# Patient Record
Sex: FEMALE | Race: WHITE | NOT HISPANIC OR LATINO | Employment: FULL TIME | URBAN - METROPOLITAN AREA
[De-identification: names, ages, dates, MRNs, and addresses within clinical notes are randomized per-mention and may not be internally consistent; named-entity substitution may affect disease eponyms.]

---

## 2017-10-30 ENCOUNTER — TRANSCRIBE ORDERS (OUTPATIENT)
Dept: ADMINISTRATIVE | Facility: HOSPITAL | Age: 47
End: 2017-10-30

## 2017-10-30 DIAGNOSIS — C43.9 MALIGNANT MELANOMA, UNSPECIFIED SITE (HCC): Primary | ICD-10-CM

## 2017-11-03 ENCOUNTER — HOSPITAL ENCOUNTER (OUTPATIENT)
Dept: RADIOLOGY | Age: 47
Discharge: HOME | End: 2017-11-03
Payer: COMMERCIAL

## 2017-11-03 ENCOUNTER — TRANSCRIBE ORDERS (OUTPATIENT)
Dept: ADMINISTRATIVE | Age: 47
End: 2017-11-03

## 2017-11-03 ENCOUNTER — APPOINTMENT (OUTPATIENT)
Dept: LAB | Age: 47
End: 2017-11-03
Payer: COMMERCIAL

## 2017-11-03 DIAGNOSIS — C43.52: ICD-10-CM

## 2017-11-03 DIAGNOSIS — C43.9 MALIGNANT MELANOMA, UNSPECIFIED SITE (HCC): ICD-10-CM

## 2017-11-03 DIAGNOSIS — C43.52: Primary | ICD-10-CM

## 2017-11-03 LAB
25(OH)D3 SERPL-MCNC: 22.7 NG/ML (ref 30–100)
ALBUMIN SERPL BCP-MCNC: 3.7 G/DL (ref 3.5–5)
ALP SERPL-CCNC: 84 U/L (ref 46–116)
ALT SERPL W P-5'-P-CCNC: 23 U/L (ref 12–78)
ANION GAP SERPL CALCULATED.3IONS-SCNC: 4 MMOL/L (ref 4–13)
AST SERPL W P-5'-P-CCNC: 12 U/L (ref 5–45)
BILIRUB SERPL-MCNC: 0.3 MG/DL (ref 0.2–1)
BUN SERPL-MCNC: 11 MG/DL (ref 5–25)
CALCIUM SERPL-MCNC: 8.7 MG/DL (ref 8.3–10.1)
CHLORIDE SERPL-SCNC: 106 MMOL/L (ref 100–108)
CO2 SERPL-SCNC: 28 MMOL/L (ref 21–32)
CREAT SERPL-MCNC: 0.89 MG/DL (ref 0.6–1.3)
ERYTHROCYTE [DISTWIDTH] IN BLOOD BY AUTOMATED COUNT: 12.7 % (ref 11.6–15.1)
GFR SERPL CREATININE-BSD FRML MDRD: 78 ML/MIN/1.73SQ M
GLUCOSE P FAST SERPL-MCNC: 100 MG/DL (ref 65–99)
HCT VFR BLD AUTO: 40.5 % (ref 34.8–46.1)
HGB BLD-MCNC: 13.8 G/DL (ref 11.5–15.4)
MCH RBC QN AUTO: 29.8 PG (ref 26.8–34.3)
MCHC RBC AUTO-ENTMCNC: 34.1 G/DL (ref 31.4–37.4)
MCV RBC AUTO: 88 FL (ref 82–98)
PLATELET # BLD AUTO: 356 THOUSANDS/UL (ref 149–390)
PMV BLD AUTO: 10.4 FL (ref 8.9–12.7)
POTASSIUM SERPL-SCNC: 4.6 MMOL/L (ref 3.5–5.3)
PROT SERPL-MCNC: 7.6 G/DL (ref 6.4–8.2)
RBC # BLD AUTO: 4.63 MILLION/UL (ref 3.81–5.12)
SODIUM SERPL-SCNC: 138 MMOL/L (ref 136–145)
TSH SERPL DL<=0.05 MIU/L-ACNC: 2.25 UIU/ML (ref 0.36–3.74)
WBC # BLD AUTO: 7.45 THOUSAND/UL (ref 4.31–10.16)

## 2017-11-03 PROCEDURE — 70450 CT HEAD/BRAIN W/O DYE: CPT

## 2017-11-03 PROCEDURE — 84443 ASSAY THYROID STIM HORMONE: CPT

## 2017-11-03 PROCEDURE — 85027 COMPLETE CBC AUTOMATED: CPT

## 2017-11-03 PROCEDURE — 82306 VITAMIN D 25 HYDROXY: CPT

## 2017-11-03 PROCEDURE — 36415 COLL VENOUS BLD VENIPUNCTURE: CPT

## 2017-11-03 PROCEDURE — 80053 COMPREHEN METABOLIC PANEL: CPT

## 2020-02-10 ENCOUNTER — OFFICE VISIT (OUTPATIENT)
Dept: URGENT CARE | Age: 50
End: 2020-02-10
Payer: COMMERCIAL

## 2020-02-10 VITALS
HEIGHT: 65 IN | HEART RATE: 76 BPM | RESPIRATION RATE: 20 BRPM | TEMPERATURE: 98.2 F | BODY MASS INDEX: 37.42 KG/M2 | OXYGEN SATURATION: 98 % | SYSTOLIC BLOOD PRESSURE: 141 MMHG | WEIGHT: 224.6 LBS | DIASTOLIC BLOOD PRESSURE: 90 MMHG

## 2020-02-10 DIAGNOSIS — J40 BRONCHITIS: ICD-10-CM

## 2020-02-10 DIAGNOSIS — J01.90 ACUTE NON-RECURRENT SINUSITIS, UNSPECIFIED LOCATION: Primary | ICD-10-CM

## 2020-02-10 PROCEDURE — 99203 OFFICE O/P NEW LOW 30 MIN: CPT | Performed by: PHYSICIAN ASSISTANT

## 2020-02-10 RX ORDER — AMOXICILLIN AND CLAVULANATE POTASSIUM 875; 125 MG/1; MG/1
1 TABLET, FILM COATED ORAL EVERY 12 HOURS SCHEDULED
Qty: 20 TABLET | Refills: 0 | Status: SHIPPED | OUTPATIENT
Start: 2020-02-10 | End: 2020-02-20

## 2020-02-10 RX ORDER — BENZONATATE 100 MG/1
100 CAPSULE ORAL 3 TIMES DAILY PRN
Qty: 20 CAPSULE | Refills: 0 | Status: SHIPPED | OUTPATIENT
Start: 2020-02-10 | End: 2022-02-10 | Stop reason: ALTCHOICE

## 2020-02-10 RX ORDER — NAPROXEN SODIUM 550 MG/1
1 TABLET ORAL AS NEEDED
COMMUNITY
Start: 2012-04-25 | End: 2022-06-15

## 2020-02-10 RX ORDER — FLUTICASONE PROPIONATE 50 MCG
1 SPRAY, SUSPENSION (ML) NASAL DAILY
Qty: 1 BOTTLE | Refills: 0 | Status: SHIPPED | OUTPATIENT
Start: 2020-02-10 | End: 2022-02-10 | Stop reason: ALTCHOICE

## 2020-02-10 NOTE — PATIENT INSTRUCTIONS
Acute Bronchitis   WHAT YOU NEED TO KNOW:   Acute bronchitis is swelling and irritation in the air passages of your lungs  This irritation may cause you to cough or have other breathing problems  Acute bronchitis often starts because of another illness, such as a cold or the flu  The illness spreads from your nose and throat to your windpipe and airways  Bronchitis is often called a chest cold  Acute bronchitis lasts about 3 to 6 weeks and is usually not a serious illness  Your cough can last for several weeks  DISCHARGE INSTRUCTIONS:   Return to the emergency department if:   · You cough up blood  · Your lips or fingernails turn blue  · You feel like you are not getting enough air when you breathe  Contact your healthcare provider if:   · You have a fever  · Your breathing problems do not go away or get worse  · Your cough does not get better within 4 weeks  · You have questions or concerns about your condition or care  Self-care:   · Get more rest   Rest helps your body to heal  Slowly start to do more each day  Rest when you feel it is needed  · Avoid irritants in the air  Avoid chemicals, fumes, and dust  Wear a face mask if you must work around dust or fumes  Stay inside on days when air pollution levels are high  If you have allergies, stay inside when pollen counts are high  Do not use aerosol products, such as spray-on deodorant, bug spray, and hair spray  · Do not smoke or be around others who smoke  Nicotine and other chemicals in cigarettes and cigars damages the cilia that move mucus out of your lungs  Ask your healthcare provider for information if you currently smoke and need help to quit  E-cigarettes or smokeless tobacco still contain nicotine  Talk to your healthcare provider before you use these products  · Drink liquids as directed  Liquids help keep your air passages moist and help you cough up mucus   You may need to drink more liquids when you have acute bronchitis  Ask how much liquid to drink each day and which liquids are best for you  · Use a humidifier or vaporizer  Use a cool mist humidifier or a vaporizer to increase air moisture in your home  This may make it easier for you to breathe and help decrease your cough  Decrease risk for acute bronchitis:   · Get the vaccinations you need  Ask your healthcare provider if you should get vaccinated against the flu or pneumonia  · Prevent the spread of germs  You can decrease your risk of acute bronchitis and other illnesses by doing the following:     [facility] your hands often with soap and water  Carry germ-killing hand lotion or gel with you  You can use the lotion or gel to clean your hands when soap and water are not available  ¨ Do not touch your eyes, nose, or mouth unless you have washed your hands first     ¨ Always cover your mouth when you cough to prevent the spread of germs  It is best to cough into a tissue or your shirt sleeve instead of into your hand  Ask those around you cover their mouths when they cough  ¨ Try to avoid people who have a cold or the flu  If you are sick, stay away from others as much as possible  Medicines: Your healthcare provider may  give you any of the following:  · Ibuprofen or acetaminophen  are medicines that help lower your fever  They are available without a doctor's order  Ask your healthcare provider which medicine is right for you  Ask how much to take and how often to take it  Follow directions  These medicines can cause stomach bleeding if not taken correctly  Ibuprofen can cause kidney damage  Do not take ibuprofen if you have kidney disease, an ulcer, or allergies to aspirin  Acetaminophen can cause liver damage  Do not take more than 4,000 milligrams in 24 hours  · Decongestants  help loosen mucus in your lungs and make it easier to cough up  This can help you breathe easier  · Cough suppressants  decrease your urge to cough   If your cough produces mucus, do not take a cough suppressant unless your healthcare provider tells you to  Your healthcare provider may suggest that you take a cough suppressant at night so you can rest     · Inhalers  may be given  Your healthcare provider may give you one or more inhalers to help you breathe easier and cough less  An inhaler gives your medicine to open your airways  Ask your healthcare provider to show you how to use your inhaler correctly  · Take your medicine as directed  Contact your healthcare provider if you think your medicine is not helping or if you have side effects  Tell him of her if you are allergic to any medicine  Keep a list of the medicines, vitamins, and herbs you take  Include the amounts, and when and why you take them  Bring the list or the pill bottles to follow-up visits  Carry your medicine list with you in case of an emergency  Follow up with your healthcare provider as directed:  Write down questions you have so you will remember to ask them during your follow-up visits  © 2017 [employer] [employer] Information is for End User's use only and may not be sold, redistributed or otherwise used for commercial purposes  All illustrations and images included in CareNotes® are the copyrighted property of [employer]  or Octaviano Lacy  The above information is an  only  It is not intended as medical advice for individual conditions or treatments  Talk to your doctor, nurse or pharmacist before following any medical regimen to see if it is safe and effective for you  Sinusitis   WHAT YOU NEED TO KNOW:   Sinusitis is inflammation or infection of your sinuses  It is most often caused by a virus  Acute sinusitis may last up to 12 weeks  Chronic sinusitis lasts longer than 12 weeks  Recurrent sinusitis means you have 4 or more times in 1 year     DISCHARGE INSTRUCTIONS:   Return to the emergency department if:   · Your eye and eyelid are red, swollen, and painful  · You cannot open your eye  · You have vision changes, such as double vision  · Your eyeball bulges out or you cannot move your eye  · You are more sleepy than normal, or you notice changes in your ability to think, move, or talk  · You have a stiff neck, a fever, or a bad headache  · You have swelling of your forehead or scalp  Contact your healthcare provider if:   · Your symptoms do not improve after 3 days  · Your symptoms do not go away after 10 days  · You have nausea and are vomiting  · Your nose is bleeding  · You have questions or concerns about your condition or care  Medicines: Your symptoms may go away on their own  Your healthcare provider may recommend watchful waiting for up to 10 days before starting antibiotics  You may  need any of the following:  · Acetaminophen  decreases pain and fever  It is available without a doctor's order  Ask how much to take and how often to take it  Follow directions  Read the labels of all other medicines you are using to see if they also contain acetaminophen, or ask your doctor or pharmacist  Acetaminophen can cause liver damage if not taken correctly  Do not use more than 4 grams (4,000 milligrams) total of acetaminophen in one day  · NSAIDs , such as ibuprofen, help decrease swelling, pain, and fever  This medicine is available with or without a doctor's order  NSAIDs can cause stomach bleeding or kidney problems in certain people  If you take blood thinner medicine, always ask your healthcare provider if NSAIDs are safe for you  Always read the medicine label and follow directions  · Nasal steroid sprays  may help decrease inflammation in your nose and sinuses  · Decongestants  help reduce swelling and drain mucus in the nose and sinuses  They may help you breathe easier  · Antihistamines  help dry mucus in the nose and relieve sneezing       · Antibiotics  help treat or prevent a bacterial infection  · Take your medicine as directed  Contact your healthcare provider if you think your medicine is not helping or if you have side effects  Tell him or her if you are allergic to any medicine  Keep a list of the medicines, vitamins, and herbs you take  Include the amounts, and when and why you take them  Bring the list or the pill bottles to follow-up visits  Carry your medicine list with you in case of an emergency  Self-care:   · Rinse your sinuses  Use a sinus rinse device to rinse your nasal passages with a saline (salt water) solution or distilled water  Do not use tap water  This will help thin the mucus in your nose and rinse away pollen and dirt  It will also help reduce swelling so you can breathe normally  Ask your healthcare provider how often to do this  · Breathe in steam   Heat a bowl of water until you see steam  Lean over the bowl and make a tent over your head with a large towel  Breathe deeply for about 20 minutes  Be careful not to get too close to the steam or burn yourself  Do this 3 times a day  You can also breathe deeply when you take a hot shower  · Sleep with your head elevated  Place an extra pillow under your head before you go to sleep to help your sinuses drain  · Drink liquids as directed  Ask your healthcare provider how much liquid to drink each day and which liquids are best for you  Liquids will thin the mucus in your nose and help it drain  Avoid drinks that contain alcohol or caffeine  · Do not smoke, and avoid secondhand smoke  Nicotine and other chemicals in cigarettes and cigars can make your symptoms worse  Ask your healthcare provider for information if you currently smoke and need help to quit  E-cigarettes or smokeless tobacco still contain nicotine  Talk to your healthcare provider before you use these products  Prevent the spread of germs that cause sinusitis:  Wash your hands often with soap and water   Wash your hands after you use the bathroom, change a child's diaper, or sneeze  Wash your hands before you prepare or eat food  Follow up with your healthcare provider as directed: You may be referred to an ear, nose, and throat specialist  Write down your questions so you remember to ask them during your visits  © 2017 [employer] [employer] Information is for End User's use only and may not be sold, redistributed or otherwise used for commercial purposes  All illustrations and images included in CareNotes® are the copyrighted property of [employer]  or [employer]av[employer] Lacy  The above information is an  only  It is not intended as medical advice for individual conditions or treatments  Talk to your doctor, nurse or pharmacist before following any medical regimen to see if it is safe and effective for you

## 2020-02-10 NOTE — PROGRESS NOTES
[facility]        NAME: Janneth Ha is a 51 y o  female  : 1970    MRN: [id number]  DATE: February 10, 2020  TIME: 11:51 AM    /90 (BP Location: Left arm, Patient Position: Sitting, Cuff Size: Standard)   Pulse 76   Temp 98 2 °F (36 8 °C) (Tympanic)   Resp 20   Ht 5' 5" (1 651 m)   Wt 102 kg (224 lb 9 6 oz)   SpO2 98%   BMI 37 38 kg/m²     Assessment and Plan   Acute non-recurrent sinusitis, unspecified location [J01 90]  1  Acute non-recurrent sinusitis, unspecified location  fluticasone (FLONASE) 50 mcg/act nasal spray    benzonatate (TESSALON PERLES) 100 mg capsule    amoxicillin-clavulanate (AUGMENTIN) 875-125 mg per tablet   2  Bronchitis  fluticasone (FLONASE) 50 mcg/act nasal spray    benzonatate (TESSALON PERLES) 100 mg capsule    amoxicillin-clavulanate (AUGMENTIN) 875-125 mg per tablet         Patient Instructions       Follow up with PCP in 3-5 days  Proceed to  ER if symptoms worsen  Chief Complaint     Chief Complaint   Patient presents with    Cough     pt has c/o cough, stuffy nose, low grade fever and sinus drip to her throat  symptoms started on friday  History of Present Illness       Pt with sinus congestion and cough for 1 week     Sinusitis   This is a new problem  The current episode started in the past 7 days  The problem is unchanged  There has been no fever  Her pain is at a severity of 2/10  The pain is mild  Associated symptoms include congestion, coughing and sinus pressure  Pertinent negatives include no chills, diaphoresis, ear pain, headaches, hoarse voice, neck pain, shortness of breath, sneezing, sore throat or swollen glands  Past treatments include nothing  The treatment provided no relief  Review of Systems   Review of Systems   Constitutional: Negative  Negative for chills and diaphoresis  HENT: Positive for congestion and sinus pressure  Negative for ear pain, hoarse voice, sneezing and sore throat  Eyes: Negative  Respiratory: Positive for cough  Negative for shortness of breath  Cardiovascular: Negative  Gastrointestinal: Negative  Endocrine: Negative  Genitourinary: Negative  Musculoskeletal: Negative  Negative for neck pain  Allergic/Immunologic: Negative  Neurological: Negative  Negative for headaches  Hematological: Negative  Psychiatric/Behavioral: Negative  All other systems reviewed and are negative  Current Medications       Current Outpatient Medications:     naproxen sodium (ANAPROX DS) 550 mg tablet, Take by mouth daily, Disp: , Rfl:     amoxicillin-clavulanate (AUGMENTIN) 875-125 mg per tablet, Take 1 tablet by mouth every 12 (twelve) hours for 10 days, Disp: 20 tablet, Rfl: 0    benzonatate (TESSALON PERLES) 100 mg capsule, Take 1 capsule (100 mg total) by mouth 3 (three) times a day as needed for cough, Disp: 20 capsule, Rfl: 0    fluticasone (FLONASE) 50 mcg/act nasal spray, 1 spray into each nostril daily, Disp: 1 Bottle, Rfl: 0    Current Allergies     Allergies as of 02/10/2020    (No Known Allergies)            The following portions of the patient's history were reviewed and updated as appropriate: allergies, current medications, past family history, past medical history, past social history, past surgical history and problem list      History reviewed  No pertinent past medical history  History reviewed  No pertinent surgical history  History reviewed  No pertinent family history  Medications have been verified  Objective   /90 (BP Location: Left arm, Patient Position: Sitting, Cuff Size: Standard)   Pulse 76   Temp 98 2 °F (36 8 °C) (Tympanic)   Resp 20   Ht 5' 5" (1 651 m)   Wt 102 kg (224 lb 9 6 oz)   SpO2 98%   BMI 37 38 kg/m²        Physical Exam     Physical Exam   Constitutional: She is oriented to person, place, and time  She appears well-developed and well-nourished  HENT:   Head: Normocephalic and atraumatic     Right Ear: External ear normal    Left Ear: External ear normal    Mouth/Throat: Oropharynx is clear and moist    Boggy mucosa max sinus tender to palp    Eyes: Pupils are equal, round, and reactive to light  Conjunctivae and EOM are normal    Neck: Normal range of motion  Neck supple  Cardiovascular: Normal rate, regular rhythm, normal heart sounds and intact distal pulses  Pulmonary/Chest: Effort normal and breath sounds normal    Abdominal: Soft  Bowel sounds are normal    Musculoskeletal: Normal range of motion  Neurological: She is alert and oriented to person, place, and time  Skin: Skin is warm  Capillary refill takes less than 2 seconds  Psychiatric: She has a normal mood and affect  Her behavior is normal    Nursing note and vitals reviewed

## 2021-08-20 ENCOUNTER — OFFICE VISIT (OUTPATIENT)
Dept: URGENT CARE | Age: 51
End: 2021-08-20
Payer: COMMERCIAL

## 2021-08-20 VITALS
HEIGHT: 65 IN | TEMPERATURE: 98.6 F | RESPIRATION RATE: 18 BRPM | OXYGEN SATURATION: 97 % | WEIGHT: 225 LBS | BODY MASS INDEX: 37.49 KG/M2 | HEART RATE: 74 BPM

## 2021-08-20 DIAGNOSIS — Z11.59 ENCOUNTER FOR SCREENING FOR OTHER VIRAL DISEASES: ICD-10-CM

## 2021-08-20 DIAGNOSIS — Z11.59 SPECIAL SCREENING EXAMINATION FOR UNSPECIFIED VIRAL DISEASE: Primary | ICD-10-CM

## 2021-08-20 PROCEDURE — U0003 INFECTIOUS AGENT DETECTION BY NUCLEIC ACID (DNA OR RNA); SEVERE ACUTE RESPIRATORY SYNDROME CORONAVIRUS 2 (SARS-COV-2) (CORONAVIRUS DISEASE [COVID-19]), AMPLIFIED PROBE TECHNIQUE, MAKING USE OF HIGH THROUGHPUT TECHNOLOGIES AS DESCRIBED BY CMS-2020-01-R: HCPCS | Performed by: PHYSICIAN ASSISTANT

## 2021-08-20 PROCEDURE — 99213 OFFICE O/P EST LOW 20 MIN: CPT | Performed by: PHYSICIAN ASSISTANT

## 2021-08-20 PROCEDURE — U0005 INFEC AGEN DETEC AMPLI PROBE: HCPCS | Performed by: PHYSICIAN ASSISTANT

## 2021-08-20 RX ORDER — BENZONATATE 100 MG/1
100 CAPSULE ORAL 3 TIMES DAILY PRN
Qty: 20 CAPSULE | Refills: 0 | Status: SHIPPED | OUTPATIENT
Start: 2021-08-20 | End: 2022-02-10 | Stop reason: ALTCHOICE

## 2021-08-20 RX ORDER — FLUTICASONE PROPIONATE 50 MCG
1 SPRAY, SUSPENSION (ML) NASAL DAILY
Qty: 15.8 ML | Refills: 0 | Status: SHIPPED | OUTPATIENT
Start: 2021-08-20 | End: 2022-02-10 | Stop reason: ALTCHOICE

## 2021-08-20 NOTE — PATIENT INSTRUCTIONS
Viral Syndrome   WHAT YOU NEED TO KNOW:   Viral syndrome is a term used for symptoms of an infection caused by a virus  Viruses are spread easily from person to person through the air and on shared items  DISCHARGE INSTRUCTIONS:   Call your local emergency number (911 in the [address] [facility]) or have someone else call if:   · You have a seizure  · You cannot be woken  · You have chest pain or trouble breathing  Return to the emergency department if:   · You have a stiff neck, a bad headache, and sensitivity to light  · You feel weak, dizzy, or confused  · You stop urinating or urinate a lot less than usual     · You cough up blood or thick yellow or green mucus  · You have severe abdominal pain or your abdomen is larger than usual     Call your doctor if:   · Your symptoms do not get better with treatment or get worse after 3 days  · You have a rash or ear pain  · You have burning when you urinate  · You have questions or concerns about your condition or care  Medicines: You may  need any of the following:  · Acetaminophen  decreases pain and fever  It is available without a doctor's order  Ask how much to take and how often to take it  Follow directions  Read the labels of all other medicines you are using to see if they also contain acetaminophen, or ask your doctor or pharmacist  Acetaminophen can cause liver damage if not taken correctly  Do not use more than 4 grams (4,000 milligrams) total of acetaminophen in one day  · NSAIDs , such as ibuprofen, help decrease swelling, pain, and fever  NSAIDs can cause stomach bleeding or kidney problems in certain people  If you take blood thinner medicine, always ask your healthcare provider if NSAIDs are safe for you  Always read the medicine label and follow directions  · Cold medicine  helps decrease swelling, control a cough, and relieve chest or nasal congestion  · Saline nasal spray  helps decrease nasal congestion       · Take your medicine as directed  Contact your healthcare provider if you think your medicine is not helping or if you have side effects  Tell him of her if you are allergic to any medicine  Keep a list of the medicines, vitamins, and herbs you take  Include the amounts, and when and why you take them  Bring the list or the pill bottles to follow-up visits  Carry your medicine list with you in case of an emergency  Manage your symptoms:   · Drink liquids as directed to prevent dehydration  Ask how much liquid to drink each day and which liquids are best for you  Ask if you should drink an oral rehydration solution (ORS)  An ORS has the right amounts of water, salts, and sugar you need to replace body fluids  This may help prevent dehydration caused by vomiting or diarrhea  Do not drink liquids with caffeine  Liquids with caffeine can make dehydration worse  · Get plenty of rest to help your body heal   Take naps throughout the day  Ask your healthcare provider when you can return to work and your normal activities  · Use a cool mist humidifier to help you breathe easier  Ask your healthcare provider how to use a cool mist humidifier  · Eat honey or use cough drops for a sore throat  Cough drops are available without a doctor's order  Follow directions for taking cough drops  · Do not smoke or be close to anyone who is smoking  Nicotine and other chemicals in cigarettes and cigars can cause lung damage  Smoking can also delay healing  Ask your healthcare provider for information if you currently smoke and need help to quit  E-cigarettes or smokeless tobacco still contain nicotine  Talk to your healthcare provider before you use these products  Prevent the spread of germs:       · Wash your hands often  Wash your hands several times each day  Wash after you use the bathroom, change a child's diaper, and before you prepare or eat food  Use soap and water every time   Rub your soapy hands together, lacing your fingers  Wash the front and back of your hands, and in between your fingers  Use the fingers of one hand to scrub under the fingernails of the other hand  Wash for at least 20 seconds  Rinse with warm, running water for several seconds  Then dry your hands with a clean towel or paper towel  Use hand  that contains alcohol if soap and water are not available  Do not touch your eyes, nose, or mouth without washing your hands first          · Cover a sneeze or cough  Use a tissue that covers your mouth and nose  Throw the tissue away in a trash can right away  Use the bend of your arm if a tissue is not available  Wash your hands well with soap and water or use a hand   · Stay away from others while you are sick  Avoid crowds as much as possible  · Ask about vaccines you may need  Talk to your healthcare provider about your vaccine history  He or she will tell you which vaccines you need, and when to get them  ? Get the influenza (flu) vaccine as soon as recommended each year  The flu vaccine is available starting in September or October  Flu viruses change, so it is important to get a flu vaccine every year  ? Get the pneumonia vaccine if recommended  This vaccine is usually recommended every 5 years  Your provider will tell you when to get this vaccine, if needed  Follow up with your doctor as directed:  Write down your questions so you remember to ask them during your visits  © Copyright [employer] 2021 Information is for End User's use only and may not be sold, redistributed or otherwise used for commercial purposes  All illustrations and images included in CareNotes® are the copyrighted property of [employer]  or [employer]  The above information is an  only  It is not intended as medical advice for individual conditions or treatments   Talk to your doctor, nurse or pharmacist before following any medical regimen to see if it is safe and effective for you

## 2021-08-20 NOTE — PROGRESS NOTES
[facility]        NAME: Farzana Gusman is a 49 y o  female  : 1970    MRN: [id number]  DATE: 2021  TIME: 4:39 PM    Assessment and Plan   Special screening examination for unspecified viral disease [Z11 59]  1  Special screening examination for unspecified viral disease  Novel Coronavirus (Covid-19),PCR [facility]    fluticasone (FLONASE) 50 mcg/act nasal spray    benzonatate (TESSALON PERLES) 100 mg capsule   2  Encounter for screening for other viral diseases  fluticasone (FLONASE) 50 mcg/act nasal spray    benzonatate (TESSALON PERLES) 100 mg capsule        discussed with patient about general measures for upper respiratory symptoms  Take medication as  Prescribed  Follow-up with family doctor in 3-4 days for further evaluation and management   Patient Instructions       Follow up with PCP in 3-5 days  Proceed to  ER if symptoms worsen  Chief Complaint     Chief Complaint   Patient presents with    COVID-19     congestion, sore throat and pain in both ears  pt just got the first covid shot on   History of Present Illness       Patient is a 52-year-old female presenting to the office complaining of sore throat and  runny nose for the last 3 days  Patient states he just recently got her 1st covid vaccination  last week  Patient states her symptoms started shortly after the vaccination  Patient has no shortness of breath wheezing chest pain or abdominal pain  Patient is tolerating p o  has no nausea vomiting or diarrhea  Patient states she does have bilateral ear fullness but no ear discharge  Patient has not been exposed to someone who has been recently diagnosed with COVID    URI   This is a new problem  The current episode started in the past 7 days  The problem has been unchanged  There has been no fever  Associated symptoms include congestion, rhinorrhea and a sore throat  She has tried nothing for the symptoms  The treatment provided no relief  Review of Systems   Review of Systems   Constitutional: Negative  HENT: Positive for congestion, rhinorrhea and sore throat  Eyes: Negative  Respiratory: Negative  Cardiovascular: Negative  Gastrointestinal: Negative  Endocrine: Negative  Genitourinary: Negative  Musculoskeletal: Negative  Allergic/Immunologic: Negative  Neurological: Negative  Hematological: Negative  Psychiatric/Behavioral: Negative  All other systems reviewed and are negative  Current Medications       Current Outpatient Medications:     benzonatate (TESSALON PERLES) 100 mg capsule, Take 1 capsule (100 mg total) by mouth 3 (three) times a day as needed for cough (Patient not taking: Reported on 8/20/2021), Disp: 20 capsule, Rfl: 0    benzonatate (TESSALON PERLES) 100 mg capsule, Take 1 capsule (100 mg total) by mouth 3 (three) times a day as needed for cough, Disp: 20 capsule, Rfl: 0    fluticasone (FLONASE) 50 mcg/act nasal spray, 1 spray into each nostril daily (Patient not taking: Reported on 8/20/2021), Disp: 1 Bottle, Rfl: 0    fluticasone (FLONASE) 50 mcg/act nasal spray, 1 spray into each nostril daily, Disp: 15 8 mL, Rfl: 0    naproxen sodium (ANAPROX DS) 550 mg tablet, Take by mouth daily (Patient not taking: Reported on 8/20/2021), Disp: , Rfl:     Current Allergies     Allergies as of 08/20/2021    (No Known Allergies)            The following portions of the patient's history were reviewed and updated as appropriate: allergies, current medications, past family history, past medical history, past social history, past surgical history and problem list      History reviewed  No pertinent past medical history  History reviewed  No pertinent surgical history  History reviewed  No pertinent family history  Medications have been verified          Objective   Pulse 74   Temp 98 6 °F (37 °C)   Resp 18   Ht 5' 5" (1 651 m)   Wt 102 kg (225 lb)   SpO2 97%   BMI 37 44 kg/m² No LMP recorded  Patient is postmenopausal        Physical Exam     Physical Exam  Vitals and nursing note reviewed  Constitutional:       Appearance: She is normal weight  HENT:      Head: Normocephalic  Right Ear: Tympanic membrane, ear canal and external ear normal       Left Ear: Tympanic membrane, ear canal and external ear normal       Nose: Congestion and rhinorrhea present  Mouth/Throat:      Mouth: Mucous membranes are moist       Pharynx: Oropharynx is clear  No oropharyngeal exudate or posterior oropharyngeal erythema  Eyes:      General: No scleral icterus  Right eye: No discharge  Left eye: No discharge  Extraocular Movements: Extraocular movements intact  Conjunctiva/sclera: Conjunctivae normal       Pupils: Pupils are equal, round, and reactive to light  Cardiovascular:      Rate and Rhythm: Normal rate  Pulses: Normal pulses  Heart sounds: No murmur heard  No friction rub  No gallop  Pulmonary:      Effort: Pulmonary effort is normal  No respiratory distress  Breath sounds: No stridor  No wheezing or rales  Chest:      Chest wall: No tenderness  Abdominal:      General: Abdomen is flat  Tenderness: There is abdominal tenderness  Musculoskeletal:         General: No swelling, tenderness, deformity or signs of injury  Normal range of motion  Cervical back: Normal range of motion  Right lower leg: No edema  Left lower leg: No edema  Skin:     General: Skin is warm  Capillary Refill: Capillary refill takes less than 2 seconds  Coloration: Skin is not jaundiced or pale  Findings: No bruising, erythema, lesion or rash  Neurological:      General: No focal deficit present  Mental Status: She is alert     Psychiatric:         Mood and Affect: Mood normal

## 2021-08-21 LAB — SARS-COV-2 RNA RESP QL NAA+PROBE: NEGATIVE

## 2021-10-22 ENCOUNTER — APPOINTMENT (OUTPATIENT)
Dept: LAB | Facility: IMAGING CENTER | Age: 51
End: 2021-10-22
Payer: COMMERCIAL

## 2021-10-22 DIAGNOSIS — R73.01 ELEVATED FASTING GLUCOSE: ICD-10-CM

## 2021-10-22 DIAGNOSIS — E55.9 VITAMIN D INSUFFICIENCY: ICD-10-CM

## 2021-10-22 DIAGNOSIS — C43.9 MALIGNANT MELANOMA, UNSPECIFIED SITE (HCC): ICD-10-CM

## 2021-10-22 DIAGNOSIS — G43.709 CHRONIC MIGRAINE WITHOUT AURA WITHOUT STATUS MIGRAINOSUS, NOT INTRACTABLE: ICD-10-CM

## 2021-10-22 DIAGNOSIS — E78.5 HYPERLIPIDEMIA, UNSPECIFIED HYPERLIPIDEMIA TYPE: ICD-10-CM

## 2021-10-22 LAB
25(OH)D3 SERPL-MCNC: 24.6 NG/ML (ref 30–100)
ALBUMIN SERPL BCP-MCNC: 3.5 G/DL (ref 3.5–5)
ALP SERPL-CCNC: 97 U/L (ref 46–116)
ALT SERPL W P-5'-P-CCNC: 46 U/L (ref 12–78)
ANION GAP SERPL CALCULATED.3IONS-SCNC: 3 MMOL/L (ref 4–13)
AST SERPL W P-5'-P-CCNC: 23 U/L (ref 5–45)
BASOPHILS # BLD AUTO: 0.08 THOUSANDS/ΜL (ref 0–0.1)
BASOPHILS NFR BLD AUTO: 1 % (ref 0–1)
BILIRUB SERPL-MCNC: 0.38 MG/DL (ref 0.2–1)
BUN SERPL-MCNC: 10 MG/DL (ref 5–25)
CALCIUM SERPL-MCNC: 9 MG/DL (ref 8.3–10.1)
CHLORIDE SERPL-SCNC: 105 MMOL/L (ref 100–108)
CHOLEST SERPL-MCNC: 188 MG/DL (ref 50–200)
CO2 SERPL-SCNC: 26 MMOL/L (ref 21–32)
CREAT SERPL-MCNC: 0.89 MG/DL (ref 0.6–1.3)
EOSINOPHIL # BLD AUTO: 0.24 THOUSAND/ΜL (ref 0–0.61)
EOSINOPHIL NFR BLD AUTO: 3 % (ref 0–6)
ERYTHROCYTE [DISTWIDTH] IN BLOOD BY AUTOMATED COUNT: 12.7 % (ref 11.6–15.1)
EST. AVERAGE GLUCOSE BLD GHB EST-MCNC: 108 MG/DL
GFR SERPL CREATININE-BSD FRML MDRD: 76 ML/MIN/1.73SQ M
GLUCOSE P FAST SERPL-MCNC: 100 MG/DL (ref 65–99)
HBA1C MFR BLD: 5.4 %
HCT VFR BLD AUTO: 42.1 % (ref 34.8–46.1)
HDLC SERPL-MCNC: 46 MG/DL
HGB BLD-MCNC: 13.7 G/DL (ref 11.5–15.4)
IMM GRANULOCYTES # BLD AUTO: 0.02 THOUSAND/UL (ref 0–0.2)
IMM GRANULOCYTES NFR BLD AUTO: 0 % (ref 0–2)
LDLC SERPL CALC-MCNC: 115 MG/DL (ref 0–100)
LYMPHOCYTES # BLD AUTO: 3.19 THOUSANDS/ΜL (ref 0.6–4.47)
LYMPHOCYTES NFR BLD AUTO: 44 % (ref 14–44)
MCH RBC QN AUTO: 29.8 PG (ref 26.8–34.3)
MCHC RBC AUTO-ENTMCNC: 32.5 G/DL (ref 31.4–37.4)
MCV RBC AUTO: 92 FL (ref 82–98)
MONOCYTES # BLD AUTO: 0.72 THOUSAND/ΜL (ref 0.17–1.22)
MONOCYTES NFR BLD AUTO: 10 % (ref 4–12)
NEUTROPHILS # BLD AUTO: 3.08 THOUSANDS/ΜL (ref 1.85–7.62)
NEUTS SEG NFR BLD AUTO: 42 % (ref 43–75)
NONHDLC SERPL-MCNC: 142 MG/DL
NRBC BLD AUTO-RTO: 0 /100 WBCS
PLATELET # BLD AUTO: 352 THOUSANDS/UL (ref 149–390)
PMV BLD AUTO: 10.4 FL (ref 8.9–12.7)
POTASSIUM SERPL-SCNC: 4.3 MMOL/L (ref 3.5–5.3)
PROT SERPL-MCNC: 7.5 G/DL (ref 6.4–8.2)
RBC # BLD AUTO: 4.6 MILLION/UL (ref 3.81–5.12)
SODIUM SERPL-SCNC: 134 MMOL/L (ref 136–145)
TRIGL SERPL-MCNC: 136 MG/DL
WBC # BLD AUTO: 7.33 THOUSAND/UL (ref 4.31–10.16)

## 2021-10-22 PROCEDURE — 36415 COLL VENOUS BLD VENIPUNCTURE: CPT

## 2021-10-22 PROCEDURE — 82306 VITAMIN D 25 HYDROXY: CPT

## 2021-10-22 PROCEDURE — 80061 LIPID PANEL: CPT

## 2021-10-22 PROCEDURE — 80053 COMPREHEN METABOLIC PANEL: CPT

## 2021-10-22 PROCEDURE — 83036 HEMOGLOBIN GLYCOSYLATED A1C: CPT

## 2021-10-22 PROCEDURE — 85025 COMPLETE CBC W/AUTO DIFF WBC: CPT

## 2021-11-11 ENCOUNTER — OFFICE VISIT (OUTPATIENT)
Dept: BARIATRICS | Facility: CLINIC | Age: 51
End: 2021-11-11
Payer: COMMERCIAL

## 2021-11-11 VITALS
BODY MASS INDEX: 40.19 KG/M2 | TEMPERATURE: 97.2 F | SYSTOLIC BLOOD PRESSURE: 132 MMHG | RESPIRATION RATE: 16 BRPM | HEIGHT: 65 IN | WEIGHT: 241.2 LBS | HEART RATE: 80 BPM | DIASTOLIC BLOOD PRESSURE: 70 MMHG

## 2021-11-11 DIAGNOSIS — E66.01 OBESITY, CLASS III, BMI 40-49.9 (MORBID OBESITY) (HCC): Primary | ICD-10-CM

## 2021-11-11 PROCEDURE — 99243 OFF/OP CNSLTJ NEW/EST LOW 30: CPT | Performed by: PHYSICIAN ASSISTANT

## 2021-11-11 RX ORDER — MELATONIN
1000 DAILY
COMMUNITY

## 2021-12-13 ENCOUNTER — OFFICE VISIT (OUTPATIENT)
Dept: BARIATRICS | Facility: CLINIC | Age: 51
End: 2021-12-13

## 2021-12-13 VITALS
SYSTOLIC BLOOD PRESSURE: 122 MMHG | BODY MASS INDEX: 39.89 KG/M2 | DIASTOLIC BLOOD PRESSURE: 72 MMHG | HEIGHT: 65 IN | HEART RATE: 62 BPM | WEIGHT: 239.4 LBS | TEMPERATURE: 97.1 F

## 2021-12-13 DIAGNOSIS — E66.01 MORBID OBESITY (HCC): Primary | ICD-10-CM

## 2021-12-13 DIAGNOSIS — E66.9 OBESITY, CLASS I, BMI 30-34.9: Primary | ICD-10-CM

## 2021-12-13 PROCEDURE — RECHECK

## 2022-01-11 NOTE — PROGRESS NOTES
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  But can leave the lab in order to drink  Working on lesson plan one and writing in her book  Feels confident with her changes so far  Eating behaviors - Trying to reduce her  diet Cokes  Trying to increase her hydration and trying not to "chug"   Practicing the 30/60 rule  Chewing her food more and slowing down when eating  Activity -   A lot of movement around the house  No structured exercise at this time  Progress toward program requirements    Workflow:  · Psych and/or D+A Clearance: N/A  · PCP Letter: 10/27/2021  · Support Group: pending  · Surgeon Appt : 2/10/2022  · EGD : pending  · Cardiac Risk Assessment: pending  (will call in Feb for a March apt)  · Sleep Studies: N/A  · Bloodwork: pending     Goals:  1  Improve her water intake with more consistency  2   Chewing food more  3  Include a form of exercise into her weekly  routine  4   Take care of her skin health  5   Attend a support group  6   Improve her vitamin routine

## 2022-01-13 ENCOUNTER — OFFICE VISIT (OUTPATIENT)
Dept: BARIATRICS | Facility: CLINIC | Age: 52
End: 2022-01-13

## 2022-01-13 VITALS — BODY MASS INDEX: 41 KG/M2 | WEIGHT: 242.6 LBS

## 2022-01-13 DIAGNOSIS — E66.01 OBESITY, CLASS III, BMI 40-49.9 (MORBID OBESITY) (HCC): Primary | ICD-10-CM

## 2022-01-13 PROCEDURE — RECHECK

## 2022-02-10 ENCOUNTER — OFFICE VISIT (OUTPATIENT)
Dept: BARIATRICS | Facility: CLINIC | Age: 52
End: 2022-02-10
Payer: COMMERCIAL

## 2022-02-10 ENCOUNTER — PREP FOR PROCEDURE (OUTPATIENT)
Dept: BARIATRICS | Facility: CLINIC | Age: 52
End: 2022-02-10

## 2022-02-10 VITALS
HEIGHT: 65 IN | TEMPERATURE: 97.4 F | BODY MASS INDEX: 40.48 KG/M2 | WEIGHT: 243 LBS | OXYGEN SATURATION: 98 % | HEART RATE: 82 BPM | SYSTOLIC BLOOD PRESSURE: 124 MMHG | DIASTOLIC BLOOD PRESSURE: 60 MMHG

## 2022-02-10 DIAGNOSIS — E66.01 MORBID (SEVERE) OBESITY DUE TO EXCESS CALORIES (HCC): Primary | ICD-10-CM

## 2022-02-10 DIAGNOSIS — E66.01 MORBID OBESITY (HCC): Primary | ICD-10-CM

## 2022-02-10 PROCEDURE — 99203 OFFICE O/P NEW LOW 30 MIN: CPT | Performed by: SURGERY

## 2022-02-10 NOTE — PROGRESS NOTES
BARIATRIC CONSULT-INITIAL - BARIATRIC SURGERY  Burma Severs Artim 49 y o  female MRN: [id number]  Unit/Bed#:  Encounter: [id number]      HPI:  Burma Severs Artim is a 49 y o  female who presents with morbid obesity to discuss weight loss options  Review of Systems    Historical Information   Past Medical History:   Diagnosis Date    Stress incontinence 2021     Past Surgical History:   Procedure Laterality Date     SECTION       Social History   Social History     Substance and Sexual Activity   Alcohol Use Never    Comment: occasional     Social History     Substance and Sexual Activity   Drug Use Never     Social History     Tobacco Use   Smoking Status Never Smoker   Smokeless Tobacco Never Used     Family History: non-contributory    Meds/Allergies   all medications and allergies reviewed  No Known Allergies    Objective       Current Vitals:   Blood Pressure: 124/60 (02/10/22 0859)  Pulse: 82 (02/10/22 0859)  Temperature: (!) 97 4 °F (36 3 °C) (02/10/22 0859)  Height: 5' 4 5" (163 8 cm) (02/10/22 0859)  Weight - Scale: 110 kg (243 lb) (02/10/22 0859)  SpO2: 98 % (02/10/22 0859)  Body mass index is 41 07 kg/m²  Invasive Devices  Report    None                 Physical Exam    Lab Results: I have personally reviewed pertinent lab results  Imaging: I have personally reviewed pertinent reports  EKG, Pathology, and Other Studies: I have personally reviewed pertinent reports  Code Status: [unfilled]  Advance Directive and Living Will:      Power of :    POLST:      Assessment/PLAN:            Patient has a long history of morbid obesity and is presenting to discuss the surgical weight loss options  Despite the patient best efforts patient was unable to lose any meaningful or sustainable weight using nonsurgical means  We had a long discussion regarding all the surgical weight-loss options at our disposal at this point and reviewed the risks and benefits of each procedure in details as it relates to her age, BMI and medical conditions  Patient elected to undergo a Sleeve Gastrectomy    Risks and benefits were explained to the patient  We also discussed the importance and need of a preoperative workup to make sure that the patient can undergo the procedure safely  Preoperative workup includes sleep apnea screening, cardiac evaluation, nutrition/psych and preoperative EGD  Risks and benefits of all the preoperative diagnostic tests were discussed with the patient including but not limited to the upper endoscopy  Alternatives to surgery and alternative forms of surgery were also explained  Postsurgical commitment and aftercare programs were discussed and explained to the patient in details   In terms of comorbidities patient suffers mostly of   Past Medical History:   Diagnosis Date    Stress incontinence 11/2021       I informed the patient that the rate of resolution of comorbid conditions following weight loss surgery is between 60 and 90% depending on the severity of the specific medical condition  I discussed and educated the patient regarding the different components of our multidisciplinary program and the importance of compliance and follow-up in the postoperative period  All questions answered  Patient understands risks and benefits  An image of the procedure was also shown to the patient  After showing the image we discussed all the technical aspects of the procedure and also the potential complications including but not limited to gastrointestinal perforation, leak, obstruction, stricture and hemorrhage  I spent 30 min with the patient more than 50% of the time was spent educating the patient and coordinating care

## 2022-03-09 NOTE — PROGRESS NOTES
Bariatric Nutrition Follow-Up Note    Type of surgery    Preop 4 months: 3 of 4 today  Surgery Date: TBD- Tentative May 2022  Surgeon: Dr Sera Mario Artim  49 y o   female     Wt with BMI of 25: 148 2lbs  Pre-Op Excess Wt: 91 2lbs  Wt 110 kg (242 lb 14 4 oz)   BMI 41 05 kg/m²     [place]- [facility] Do Equation:     Weight maintenance= 2039 kcal/day  Estimated calories for weight loss 2373-2627 kcal/day ( 1-2# per wk wt loss - sedentary )  Estimated protein needs 67 4-80 8 g/day(1 0-1 2 gms/kg IBW )   Estimated fluid needs 8848-4282 ml/day (30-35 ml/kg IBW )      Review of History and Medications   Past Medical History:   Diagnosis Date    Stress incontinence 2021     Past Surgical History:   Procedure Laterality Date     SECTION       Social History     Socioeconomic History    Marital status: /Civil Union     Spouse name: Not on file    Number of children: Not on file    Years of education: Not on file    Highest education level: Not on file   Occupational History    Not on file   Tobacco Use    Smoking status: Never Smoker    Smokeless tobacco: Never Used   Vaping Use    Vaping Use: Never used   Substance and Sexual Activity    Alcohol use: Never     Comment: occasional    Drug use: Never    Sexual activity: Yes     Partners: Male   Other Topics Concern    Not on file   Social History Narrative    Not on file     Social Determinants of Health     Financial Resource Strain: Not on file   Food Insecurity: Not on file   Transportation Needs: Not on file   Physical Activity: Not on file   Stress: Not on file   Social Connections: Not on file   Intimate Partner Violence: Not on file   Housing Stability: Not on file       Current Outpatient Medications:     cholecalciferol (VITAMIN D3) 1,000 units tablet, Take 1,000 Units by mouth daily, Disp: , Rfl:     naproxen sodium (ANAPROX DS) 550 mg tablet, Take 1 mg by mouth as needed As needed for migraine , Disp: , Rfl:      Food Intake and Lifestyle Assessment   Food Intake Assessment not completed this visit  Beverage intake: water, diet soda, coffee/tea and unsweetened iced tea  Protein supplement: none  Estimated protein intake per day: 50-60g  Estimated fluid intake per day: two 16 oz water, 2 cup coffee, 12 oz diet soda, glass water before bed  Meals eaten away from home: 3 dinners per week, couple lunches  Typical meal pattern: 3 meals per day and 2 snacks per day  Eating Behaviors: Consumption of high calorie/ high fat foods, Large portion sizes and Craves salty foods  Food allergies or intolerances: No Known Allergies NKFA  Bananas upset stomach  Cultural or Yarsanism considerations: none    Physical Assessment  Physical Activity  Types of exercise: None  Has treadmill at home  Likes to hike and [employer] [facility] [employer] and crossfit  Current physical limitations: joint pains, knee pains    Psychosocial Assessment   Support systems: lives with spouse and adult child who are supportive  Socioeconomic factors: travels frequently for work  Nutrition Diagnosis-continued  Diagnosis: Overweight / Obesity (NC-3 3), Excessive energy intake (NI-1 5) and Undesirable food choices (NB-1 7)  Related to: Physical inactivity and Excessive energy intake  As Evidenced by: BMI >25, Excessive energy intake and Unintentional weight gain     Interventions and Teaching   Discussed pre-op and post-op nutrition guidelines  Patient educated and handouts provided    Surgical changes to stomach / GI  Capacity of post-surgery stomach  Diet progression  Adequate hydration  Sugar and fat restriction to decrease "dumping syndrome"  Expected weight loss  Weight loss plateaus/ possibility of weight regain  Exercise  Suggestions for pre-op diet  Nutrition considerations after surgery  Protein supplements  Meal planning and preparation  Appropriate carbohydrate, protein, and fat intake, and food/fluid choices to maximize safe weight loss, nutrient intake, and tolerance   Dietary and lifestyle changes  Possible problems with poor eating habits  Techniques for self monitoring and keeping daily food journal  Potential for food intolerance after surgery, and ways to deal with them including: lactose intolerance, nausea, reflux, vomiting, diarrhea, food intolerance, appetite changes, gas  Vitamin / Mineral supplementation of Multivitamin with minerals and Vitamin D  Currently takes vitamin D 1000 IU  PMH Vitamin D deficiency    Education provided to: patient  Barriers to learning: No barriers identified  Readiness to change: preparation  Prior research on procedure: pre-op class and friends or family  Comprehension: needs reinforcement and verbalizes understanding   Expected Compliance: good    Evaluation / Monitoring  Dietitian to Monitor: Eating pattern as discussed Tolerance of nutrition prescription Body weight Lab values Physical activity Bowel pattern    Goals  Eliminate sugar sweetened beverages, Food journal, Exercise 30 minutes 5 times per week, Complete lession plans 1-6, Eat 3 meals per day and Eliminate mindless snacking    Workflow:   Bloodwork:   o CBC, CMP, HgbA1c, Lipid Panel done 10/22/21  CBC+CMP will need updated after 4/22/2022  Ordered today  o TSH ordered on 11/11/21 by EVE PA   Weight Loss: 07YZP=817 71#   4 Month Pre-Operative Program: 3 of 4 today  4 of 4 scheduled for 4/15/22   EGD scheduled for 4/13/22   Cardiac Risk Assessment: scheduled for 4/5/22      Time Spent:   30 minutes

## 2022-03-11 ENCOUNTER — OFFICE VISIT (OUTPATIENT)
Dept: BARIATRICS | Facility: CLINIC | Age: 52
End: 2022-03-11

## 2022-03-11 VITALS — BODY MASS INDEX: 41.05 KG/M2 | WEIGHT: 242.9 LBS

## 2022-03-11 DIAGNOSIS — Z01.818 PREOPERATIVE CLEARANCE: ICD-10-CM

## 2022-03-11 DIAGNOSIS — E66.01 OBESITY, CLASS III, BMI 40-49.9 (MORBID OBESITY) (HCC): Primary | ICD-10-CM

## 2022-03-11 DIAGNOSIS — R63.5 ABNORMAL WEIGHT GAIN: ICD-10-CM

## 2022-03-11 DIAGNOSIS — Z01.812 BLOOD TESTS PRIOR TO TREATMENT OR PROCEDURE: ICD-10-CM

## 2022-03-11 PROCEDURE — RECHECK: Performed by: DIETITIAN, REGISTERED

## 2022-03-18 ENCOUNTER — HOSPITAL ENCOUNTER (OUTPATIENT)
Dept: RADIOLOGY | Facility: HOSPITAL | Age: 52
Discharge: HOME | End: 2022-03-18
Payer: COMMERCIAL

## 2022-03-18 DIAGNOSIS — J45.909 ASTHMATIC BRONCHITIS WITHOUT COMPLICATION, UNSPECIFIED ASTHMA SEVERITY, UNSPECIFIED WHETHER PERSISTENT: ICD-10-CM

## 2022-03-18 PROCEDURE — 71046 X-RAY EXAM CHEST 2 VIEWS: CPT

## 2022-04-03 PROBLEM — Z01.810 PREOPERATIVE CARDIOVASCULAR EXAMINATION: Status: ACTIVE | Noted: 2022-04-03

## 2022-04-05 ENCOUNTER — CONSULT (OUTPATIENT)
Dept: CARDIOLOGY CLINIC | Facility: CLINIC | Age: 52
End: 2022-04-05
Payer: COMMERCIAL

## 2022-04-05 VITALS
HEART RATE: 79 BPM | DIASTOLIC BLOOD PRESSURE: 70 MMHG | WEIGHT: 242 LBS | SYSTOLIC BLOOD PRESSURE: 110 MMHG | HEIGHT: 64 IN | BODY MASS INDEX: 41.32 KG/M2

## 2022-04-05 DIAGNOSIS — E66.01 OBESITY, CLASS III, BMI 40-49.9 (MORBID OBESITY) (HCC): ICD-10-CM

## 2022-04-05 DIAGNOSIS — E66.01 MORBID OBESITY (HCC): ICD-10-CM

## 2022-04-05 DIAGNOSIS — Z01.810 PREOPERATIVE CARDIOVASCULAR EXAMINATION: Primary | ICD-10-CM

## 2022-04-05 PROCEDURE — 93000 ELECTROCARDIOGRAM COMPLETE: CPT | Performed by: INTERNAL MEDICINE

## 2022-04-05 PROCEDURE — 99203 OFFICE O/P NEW LOW 30 MIN: CPT | Performed by: INTERNAL MEDICINE

## 2022-04-05 RX ORDER — ALBUTEROL SULFATE 90 UG/1
2 AEROSOL, METERED RESPIRATORY (INHALATION) EVERY 6 HOURS PRN
COMMUNITY
Start: 2022-03-18

## 2022-04-05 NOTE — PROGRESS NOTES
[facility]  [address] [place] [id number]  Phone#  [phone]   Fax#  [phone]  *-*-*-*-*-*-*-*-*-*-*-*-*-*-*-*-*-*-*-*-*-*-*-*-*-*-*-*-*-*-*-*-*-*-*-*-*-*-*-*-*-*-*-*-*-*-*-*-*-*-*-*-*-*                                              Cardiology Consultation       ENCOUNTER DATE: 22 3:06 PM  PATIENT NAME: Airam Martin   : 1970    MRN: [id number]  AGE:51 y o  SEX: female  2328 Pooja Fernandez MD     PRIMARY CARE PHYSICIAN: Rika Bradley MD    ACTIVE DIAGNOSIS THIS VISIT  1  Preoperative cardiovascular examination  POCT ECG   2  Obesity, Class III, BMI 40-49 9 (morbid obesity) ([facility] [address] )     3  Morbid obesity ([facility] [address] )  Ambulatory referral to Cardiology     ACTIVE PROBLEM LIST  Patient Active Problem List   Diagnosis    Obesity, Class III, BMI 40-49 9 (morbid obesity) ([facility] [address] )    Preoperative cardiovascular examination       HPI:          Patient is here for cardiac approval for bariatric surgery  She has no cardiac history  Patient denies chest discomfort or shortness of breath  Patient has no palpitations  Patient denies symptoms of dizziness, lightheadedness or near-syncope/syncope  Patient denies leg edema  Patient denies symptoms of orthopnea or paroxysmal nocturnal dyspnea  Her blood pressure is good at  110/70  Her LDL is slightly elevated at 115 normal being less than 100  This should come down on its own without weight loss and I do not anticipate patient needing medication  She is a lifetime nonsmoker  Maternal grandfather had a myocardial infarction        Her EKG is normal    DISCUSSION/PLAN:        ·  recommend proceeding with bariatric surgery as planned  ·  patient is a low cardiac risk  ·  no return appointment give her is always should the need arise    Lab Studies:    Lab Results   Component Value Date    CHOLESTEROL 188 10/22/2021     Lab Results   Component Value Date    TRIG 136 10/22/2021    TRIG 90 2014     Lab Results Component Value Date    HDL 46 10/22/2021    HDL 59 09/23/2014     Lab Results   Component Value Date    LDLCALC 115 (H) 10/22/2021    LDLCALC 120 (H) 09/23/2014       Lab Results   Component Value Date    HGBA1C 5 4 10/22/2021      Lab Results   Component Value Date    EGFR 76 10/22/2021    EGFR 78 11/03/2017    SODIUM 134 (L) 10/22/2021    SODIUM 138 11/03/2017    K 4 3 10/22/2021    K 4 6 11/03/2017    K 4 0 09/23/2014     10/22/2021     11/03/2017     09/23/2014    CO2 26 10/22/2021    CO2 28 11/03/2017    CO2 29 09/23/2014    ANIONGAP 6 09/23/2014    BUN 10 10/22/2021    BUN 11 11/03/2017    BUN 13 09/23/2014    CREATININE 0 89 10/22/2021    CREATININE 0 89 11/03/2017    CREATININE 0 91 09/23/2014     Lab Results   Component Value Date    WBC 7 33 10/22/2021    WBC 7 45 11/03/2017    WBC 7 59 09/23/2014    HGB 13 7 10/22/2021    HGB 13 8 11/03/2017    HGB 14 4 09/23/2014    HCT 42 1 10/22/2021    HCT 40 5 11/03/2017    HCT 42 6 09/23/2014    MCV 92 10/22/2021    MCV 88 11/03/2017    MCV 90 09/23/2014    MCH 29 8 10/22/2021    MCH 29 8 11/03/2017    MCH 30 4 09/23/2014    MCHC 32 5 10/22/2021    MCHC 34 1 11/03/2017    MCHC 33 8 09/23/2014     10/22/2021     11/03/2017     09/23/2014        Lab Results   Component Value Date    GLUCOSE 88 09/23/2014    CALCIUM 9 0 10/22/2021    CALCIUM 8 7 11/03/2017    CALCIUM 9 1 09/23/2014    AST 23 10/22/2021    AST 12 11/03/2017    AST 17 09/23/2014    ALT 46 10/22/2021    ALT 23 11/03/2017    ALT 26 09/23/2014    ALKPHOS 97 10/22/2021    ALKPHOS 84 11/03/2017    ALKPHOS 100 09/23/2014    PROT 7 7 09/23/2014    BILITOT 0 39 09/23/2014     Results for orders placed or performed in visit on 04/05/22   POCT ECG    Narrative      Normal sinus rhythm  Normal tracing           Current Outpatient Medications:     albuterol (PROVENTIL HFA,VENTOLIN HFA) 90 mcg/act inhaler, 2 puffs every 6 (six) hours as needed, Disp: , Rfl:    cholecalciferol (VITAMIN D3) 1,000 units tablet, Take 1,000 Units by mouth daily, Disp: , Rfl:     naproxen sodium (ANAPROX DS) 550 mg tablet, Take 1 mg by mouth as needed As needed for migraine , Disp: , Rfl:   No Known Allergies    Past Medical History:   Diagnosis Date    Stress incontinence 2021     Social History     Socioeconomic History    Marital status: /Civil Union     Spouse name: Not on file    Number of children: Not on file    Years of education: Not on file    Highest education level: Not on file   Occupational History    Not on file   Tobacco Use    Smoking status: Never Smoker    Smokeless tobacco: Never Used   Vaping Use    Vaping Use: Never used   Substance and Sexual Activity    Alcohol use: Never     Comment: occasional    Drug use: Never    Sexual activity: Yes     Partners: Male   Other Topics Concern    Not on file   Social History Narrative    Not on file     Social Determinants of Health     Financial Resource Strain: Not on file   Food Insecurity: Not on file   Transportation Needs: Not on file   Physical Activity: Not on file   Stress: Not on file   Social Connections: Not on file   Intimate Partner Violence: Not on file   Housing Stability: Not on file      Family History   Problem Relation Age of Onset    Lung cancer Mother     Heart disease Mother     Diabetes Father     Cancer Maternal Uncle         bladder    Stroke Maternal Uncle     Diabetes Maternal Uncle     Heart disease Maternal Uncle     Diabetes Paternal Uncle     Heart disease Maternal Grandmother     Heart disease Maternal Grandfather     Hypertension Neg Hx     Thyroid disease Neg Hx      Past Surgical History:   Procedure Laterality Date     SECTION         Review of Systems:  Review of Systems   Constitutional: Negative  HENT: Negative  Respiratory: Negative for chest tightness and shortness of breath  Cardiovascular: Negative for chest pain and leg swelling  Gastrointestinal: Negative  Endocrine: Negative  Genitourinary: Negative  Musculoskeletal: Negative  Skin: Negative  Allergic/Immunologic: Negative  Neurological: Negative  Hematological: Negative  Psychiatric/Behavioral: Negative  Physical Exam:  /70   Pulse 79   Ht 5' 4" (1 626 m)   Wt 110 kg (242 lb)   BMI 41 54 kg/m²     PREVIOUS WEIGHTS:   Wt Readings from Last 10 Encounters:   04/05/22 110 kg (242 lb)   03/11/22 110 kg (242 lb 14 4 oz)   02/10/22 110 kg (243 lb)   01/13/22 110 kg (242 lb 9 6 oz)   12/13/21 109 kg (239 lb 6 4 oz)   11/11/21 109 kg (241 lb 3 2 oz)   08/20/21 102 kg (225 lb)   02/10/20 102 kg (224 lb 9 6 oz)   11/06/13 93 2 kg (205 lb 6 1 oz)   05/01/13 90 9 kg (200 lb 8 oz)      Physical Exam  Constitutional:       General: She is not in acute distress  Appearance: She is well-developed  HENT:      Head: Normocephalic and atraumatic  Neck:      Thyroid: No thyromegaly  Vascular: No carotid bruit or JVD  Trachea: No tracheal deviation  Cardiovascular:      Rate and Rhythm: Normal rate and regular rhythm  Pulses: Normal pulses  Heart sounds: Normal heart sounds  No murmur heard  No friction rub  No gallop  Pulmonary:      Effort: Pulmonary effort is normal  No respiratory distress  Breath sounds: Normal breath sounds  No wheezing, rhonchi or rales  Chest:      Chest wall: No tenderness  Abdominal:      General: Bowel sounds are normal  There is no distension  Palpations: Abdomen is soft  Tenderness: There is no abdominal tenderness  Musculoskeletal:         General: Normal range of motion  Cervical back: Normal range of motion and neck supple  Right lower leg: No edema  Left lower leg: No edema  Skin:     General: Skin is warm and dry  Neurological:      General: No focal deficit present  Mental Status: She is alert and oriented to person, place, and time        Gait: Gait normal    Psychiatric:         Mood and Affect: Mood normal          Behavior: Behavior normal          Thought Content: Thought content normal          Judgment: Judgment normal        ======================================================   I have personally reviewed pertinent reports  Portions of the record may have been created with voice recognition software  Occasional wrong word or "sound a like" substitutions may have occurred due to the inherent limitations of voice recognition software  Read the chart carefully and recognize, using context, where substitutions have occurred      SIGNATURES:   Hermila Geronimo MD

## 2022-04-05 NOTE — H&P (VIEW-ONLY)
[facility]  [address] [place] [address]  Phone#  [phone]   Fax#  [phone]  *-*-*-*-*-*-*-*-*-*-*-*-*-*-*-*-*-*-*-*-*-*-*-*-*-*-*-*-*-*-*-*-*-*-*-*-*-*-*-*-*-*-*-*-*-*-*-*-*-*-*-*-*-*                                              Cardiology Consultation       ENCOUNTER DATE: 22 3:06 PM  PATIENT NAME: Jennifer Martin   : 1970    MRN: [id number]  AGE:51 y o  SEX: female  [address] Pooja Fernandez MD     PRIMARY CARE PHYSICIAN: Wendelyn Dakins, MD    ACTIVE DIAGNOSIS THIS VISIT  1  Preoperative cardiovascular examination  POCT ECG   2  Obesity, Class III, BMI 40-49 9 (morbid obesity) ([facility] [address] )     3  Morbid obesity ([facility] [address] )  Ambulatory referral to Cardiology     ACTIVE PROBLEM LIST  Patient Active Problem List   Diagnosis    Obesity, Class III, BMI 40-49 9 (morbid obesity) ([facility] [address] )    Preoperative cardiovascular examination       HPI:          Patient is here for cardiac approval for bariatric surgery  She has no cardiac history  Patient denies chest discomfort or shortness of breath  Patient has no palpitations  Patient denies symptoms of dizziness, lightheadedness or near-syncope/syncope  Patient denies leg edema  Patient denies symptoms of orthopnea or paroxysmal nocturnal dyspnea  Her blood pressure is good at  110/70  Her LDL is slightly elevated at 115 normal being less than 100  This should come down on its own without weight loss and I do not anticipate patient needing medication  She is a lifetime nonsmoker  Maternal grandfather had a myocardial infarction        Her EKG is normal    DISCUSSION/PLAN:        ·  recommend proceeding with bariatric surgery as planned  ·  patient is a low cardiac risk  ·  no return appointment give her is always should the need arise    Lab Studies:    Lab Results   Component Value Date    CHOLESTEROL 188 10/22/2021     Lab Results   Component Value Date    TRIG 136 10/22/2021    TRIG 90 2014     Lab Results Component Value Date    HDL 46 10/22/2021    HDL 59 09/23/2014     Lab Results   Component Value Date    LDLCALC 115 (H) 10/22/2021    LDLCALC 120 (H) 09/23/2014       Lab Results   Component Value Date    HGBA1C 5 4 10/22/2021      Lab Results   Component Value Date    EGFR 76 10/22/2021    EGFR 78 11/03/2017    SODIUM 134 (L) 10/22/2021    SODIUM 138 11/03/2017    K 4 3 10/22/2021    K 4 6 11/03/2017    K 4 0 09/23/2014     10/22/2021     11/03/2017     09/23/2014    CO2 26 10/22/2021    CO2 28 11/03/2017    CO2 29 09/23/2014    ANIONGAP 6 09/23/2014    BUN 10 10/22/2021    BUN 11 11/03/2017    BUN 13 09/23/2014    CREATININE 0 89 10/22/2021    CREATININE 0 89 11/03/2017    CREATININE 0 91 09/23/2014     Lab Results   Component Value Date    WBC 7 33 10/22/2021    WBC 7 45 11/03/2017    WBC 7 59 09/23/2014    HGB 13 7 10/22/2021    HGB 13 8 11/03/2017    HGB 14 4 09/23/2014    HCT 42 1 10/22/2021    HCT 40 5 11/03/2017    HCT 42 6 09/23/2014    MCV 92 10/22/2021    MCV 88 11/03/2017    MCV 90 09/23/2014    MCH 29 8 10/22/2021    MCH 29 8 11/03/2017    MCH 30 4 09/23/2014    MCHC 32 5 10/22/2021    MCHC 34 1 11/03/2017    MCHC 33 8 09/23/2014     10/22/2021     11/03/2017     09/23/2014        Lab Results   Component Value Date    GLUCOSE 88 09/23/2014    CALCIUM 9 0 10/22/2021    CALCIUM 8 7 11/03/2017    CALCIUM 9 1 09/23/2014    AST 23 10/22/2021    AST 12 11/03/2017    AST 17 09/23/2014    ALT 46 10/22/2021    ALT 23 11/03/2017    ALT 26 09/23/2014    ALKPHOS 97 10/22/2021    ALKPHOS 84 11/03/2017    ALKPHOS 100 09/23/2014    PROT 7 7 09/23/2014    BILITOT 0 39 09/23/2014     Results for orders placed or performed in visit on 04/05/22   POCT ECG    Narrative      Normal sinus rhythm  Normal tracing           Current Outpatient Medications:     albuterol (PROVENTIL HFA,VENTOLIN HFA) 90 mcg/act inhaler, 2 puffs every 6 (six) hours as needed, Disp: , Rfl:    cholecalciferol (VITAMIN D3) 1,000 units tablet, Take 1,000 Units by mouth daily, Disp: , Rfl:     naproxen sodium (ANAPROX DS) 550 mg tablet, Take 1 mg by mouth as needed As needed for migraine , Disp: , Rfl:   No Known Allergies    Past Medical History:   Diagnosis Date    Stress incontinence 2021     Social History     Socioeconomic History    Marital status: /Civil Union     Spouse name: Not on file    Number of children: Not on file    Years of education: Not on file    Highest education level: Not on file   Occupational History    Not on file   Tobacco Use    Smoking status: Never Smoker    Smokeless tobacco: Never Used   Vaping Use    Vaping Use: Never used   Substance and Sexual Activity    Alcohol use: Never     Comment: occasional    Drug use: Never    Sexual activity: Yes     Partners: Male   Other Topics Concern    Not on file   Social History Narrative    Not on file     Social Determinants of Health     Financial Resource Strain: Not on file   Food Insecurity: Not on file   Transportation Needs: Not on file   Physical Activity: Not on file   Stress: Not on file   Social Connections: Not on file   Intimate Partner Violence: Not on file   Housing Stability: Not on file      Family History   Problem Relation Age of Onset    Lung cancer Mother     Heart disease Mother     Diabetes Father     Cancer Maternal Uncle         bladder    Stroke Maternal Uncle     Diabetes Maternal Uncle     Heart disease Maternal Uncle     Diabetes Paternal Uncle     Heart disease Maternal Grandmother     Heart disease Maternal Grandfather     Hypertension Neg Hx     Thyroid disease Neg Hx      Past Surgical History:   Procedure Laterality Date     SECTION         Review of Systems:  Review of Systems   Constitutional: Negative  HENT: Negative  Respiratory: Negative for chest tightness and shortness of breath  Cardiovascular: Negative for chest pain and leg swelling  Gastrointestinal: Negative  Endocrine: Negative  Genitourinary: Negative  Musculoskeletal: Negative  Skin: Negative  Allergic/Immunologic: Negative  Neurological: Negative  Hematological: Negative  Psychiatric/Behavioral: Negative  Physical Exam:  /70   Pulse 79   Ht 5' 4" (1 626 m)   Wt 110 kg (242 lb)   BMI 41 54 kg/m²     PREVIOUS WEIGHTS:   Wt Readings from Last 10 Encounters:   04/05/22 110 kg (242 lb)   03/11/22 110 kg (242 lb 14 4 oz)   02/10/22 110 kg (243 lb)   01/13/22 110 kg (242 lb 9 6 oz)   12/13/21 109 kg (239 lb 6 4 oz)   11/11/21 109 kg (241 lb 3 2 oz)   08/20/21 102 kg (225 lb)   02/10/20 102 kg (224 lb 9 6 oz)   11/06/13 93 2 kg (205 lb 6 1 oz)   05/01/13 90 9 kg (200 lb 8 oz)      Physical Exam  Constitutional:       General: She is not in acute distress  Appearance: She is well-developed  HENT:      Head: Normocephalic and atraumatic  Neck:      Thyroid: No thyromegaly  Vascular: No carotid bruit or JVD  Trachea: No tracheal deviation  Cardiovascular:      Rate and Rhythm: Normal rate and regular rhythm  Pulses: Normal pulses  Heart sounds: Normal heart sounds  No murmur heard  No friction rub  No gallop  Pulmonary:      Effort: Pulmonary effort is normal  No respiratory distress  Breath sounds: Normal breath sounds  No wheezing, rhonchi or rales  Chest:      Chest wall: No tenderness  Abdominal:      General: Bowel sounds are normal  There is no distension  Palpations: Abdomen is soft  Tenderness: There is no abdominal tenderness  Musculoskeletal:         General: Normal range of motion  Cervical back: Normal range of motion and neck supple  Right lower leg: No edema  Left lower leg: No edema  Skin:     General: Skin is warm and dry  Neurological:      General: No focal deficit present  Mental Status: She is alert and oriented to person, place, and time        Gait: Gait normal    Psychiatric:         Mood and Affect: Mood normal          Behavior: Behavior normal          Thought Content: Thought content normal          Judgment: Judgment normal        ======================================================   I have personally reviewed pertinent reports  Portions of the record may have been created with voice recognition software  Occasional wrong word or "sound a like" substitutions may have occurred due to the inherent limitations of voice recognition software  Read the chart carefully and recognize, using context, where substitutions have occurred      SIGNATURES:   Bela Davila MD

## 2022-04-05 NOTE — LETTER
2022     Mercedez Barahona, [address] [place] [address] [address] [facility]    Patient: Elian Martin   YOB: 1970   Date of Visit: 2022       Dear Dr Gipson President: Thank you for referring [facility] to me for evaluation  Below are my notes for this consultation  If you have questions, please do not hesitate to call me  I look forward to following your patient along with you  Sincerely,        Pooja Thomas MD        CC: No Recipients  Pooja Thomas MD  2022  3:18 PM  Sign when Signing Visit     [facility]  [address] [place] [address]  Phone#  [phone]   Fax#  [phone]  *-*-*-*-*-*-*-*-*-*-*-*-*-*-*-*-*-*-*-*-*-*-*-*-*-*-*-*-*-*-*-*-*-*-*-*-*-*-*-*-*-*-*-*-*-*-*-*-*-*-*-*-*-*                                              Cardiology Consultation       ENCOUNTER DATE: 22 3:06 PM  PATIENT NAME: Elian Martin   : 1970    MRN: [id number]  AGE:51 y o  SEX: female  8950 Pooja Fernandez MD     PRIMARY CARE PHYSICIAN: Carmen Brice MD    ACTIVE DIAGNOSIS THIS VISIT  1  Preoperative cardiovascular examination  POCT ECG   2  Obesity, Class III, BMI 40-49 9 (morbid obesity) ([facility] [address] )     3  Morbid obesity ([facility] [address] )  Ambulatory referral to Cardiology     ACTIVE PROBLEM LIST  Patient Active Problem List   Diagnosis    Obesity, Class III, BMI 40-49 9 (morbid obesity) ([facility] [address] )    Preoperative cardiovascular examination       HPI:          Patient is here for cardiac approval for bariatric surgery  She has no cardiac history  Patient denies chest discomfort or shortness of breath  Patient has no palpitations  Patient denies symptoms of dizziness, lightheadedness or near-syncope/syncope  Patient denies leg edema  Patient denies symptoms of orthopnea or paroxysmal nocturnal dyspnea  Her blood pressure is good at  110/70  Her LDL is slightly elevated at 115 normal being less than 100   This should come down on its own without weight loss and I do not anticipate patient needing medication  She is a lifetime nonsmoker  Maternal grandfather had a myocardial infarction        Her EKG is normal    DISCUSSION/PLAN:        ·  recommend proceeding with bariatric surgery as planned  ·  patient is a low cardiac risk  ·  no return appointment give her is always should the need arise    Lab Studies:    Lab Results   Component Value Date    CHOLESTEROL 188 10/22/2021     Lab Results   Component Value Date    TRIG 136 10/22/2021    TRIG 90 09/23/2014     Lab Results   Component Value Date    HDL 46 10/22/2021    HDL 59 09/23/2014     Lab Results   Component Value Date    LDLCALC 115 (H) 10/22/2021    LDLCALC 120 (H) 09/23/2014       Lab Results   Component Value Date    HGBA1C 5 4 10/22/2021      Lab Results   Component Value Date    EGFR 76 10/22/2021    EGFR 78 11/03/2017    SODIUM 134 (L) 10/22/2021    SODIUM 138 11/03/2017    K 4 3 10/22/2021    K 4 6 11/03/2017    K 4 0 09/23/2014     10/22/2021     11/03/2017     09/23/2014    CO2 26 10/22/2021    CO2 28 11/03/2017    CO2 29 09/23/2014    ANIONGAP 6 09/23/2014    BUN 10 10/22/2021    BUN 11 11/03/2017    BUN 13 09/23/2014    CREATININE 0 89 10/22/2021    CREATININE 0 89 11/03/2017    CREATININE 0 91 09/23/2014     Lab Results   Component Value Date    WBC 7 33 10/22/2021    WBC 7 45 11/03/2017    WBC 7 59 09/23/2014    HGB 13 7 10/22/2021    HGB 13 8 11/03/2017    HGB 14 4 09/23/2014    HCT 42 1 10/22/2021    HCT 40 5 11/03/2017    HCT 42 6 09/23/2014    MCV 92 10/22/2021    MCV 88 11/03/2017    MCV 90 09/23/2014    MCH 29 8 10/22/2021    MCH 29 8 11/03/2017    MCH 30 4 09/23/2014    MCHC 32 5 10/22/2021    MCHC 34 1 11/03/2017    MCHC 33 8 09/23/2014     10/22/2021     11/03/2017     09/23/2014        Lab Results   Component Value Date    GLUCOSE 88 09/23/2014    CALCIUM 9 0 10/22/2021    CALCIUM 8 7 11/03/2017    CALCIUM 9 1 09/23/2014 AST 23 10/22/2021    AST 12 11/03/2017    AST 17 09/23/2014    ALT 46 10/22/2021    ALT 23 11/03/2017    ALT 26 09/23/2014    ALKPHOS 97 10/22/2021    ALKPHOS 84 11/03/2017    ALKPHOS 100 09/23/2014    PROT 7 7 09/23/2014    BILITOT 0 39 09/23/2014     Results for orders placed or performed in visit on 04/05/22   POCT ECG    Narrative      Normal sinus rhythm  Normal tracing           Current Outpatient Medications:     albuterol (PROVENTIL HFA,VENTOLIN HFA) 90 mcg/act inhaler, 2 puffs every 6 (six) hours as needed, Disp: , Rfl:     cholecalciferol (VITAMIN D3) 1,000 units tablet, Take 1,000 Units by mouth daily, Disp: , Rfl:     naproxen sodium (ANAPROX DS) 550 mg tablet, Take 1 mg by mouth as needed As needed for migraine , Disp: , Rfl:   No Known Allergies    Past Medical History:   Diagnosis Date    Stress incontinence 11/2021     Social History     Socioeconomic History    Marital status: /Civil Union     Spouse name: Not on file    Number of children: Not on file    Years of education: Not on file    Highest education level: Not on file   Occupational History    Not on file   Tobacco Use    Smoking status: Never Smoker    Smokeless tobacco: Never Used   Vaping Use    Vaping Use: Never used   Substance and Sexual Activity    Alcohol use: Never     Comment: occasional    Drug use: Never    Sexual activity: Yes     Partners: Male   Other Topics Concern    Not on file   Social History Narrative    Not on file     Social Determinants of Health     Financial Resource Strain: Not on file   Food Insecurity: Not on file   Transportation Needs: Not on file   Physical Activity: Not on file   Stress: Not on file   Social Connections: Not on file   Intimate Partner Violence: Not on file   Housing Stability: Not on file      Family History   Problem Relation Age of Onset    Lung cancer Mother     Heart disease Mother     Diabetes Father     Cancer Maternal Uncle         bladder    Stroke Maternal Uncle     Diabetes Maternal Uncle     Heart disease Maternal Uncle     Diabetes Paternal Uncle     Heart disease Maternal Grandmother     Heart disease Maternal Grandfather     Hypertension Neg Hx     Thyroid disease Neg Hx      Past Surgical History:   Procedure Laterality Date     SECTION         Review of Systems:  Review of Systems   Constitutional: Negative  HENT: Negative  Respiratory: Negative for chest tightness and shortness of breath  Cardiovascular: Negative for chest pain and leg swelling  Gastrointestinal: Negative  Endocrine: Negative  Genitourinary: Negative  Musculoskeletal: Negative  Skin: Negative  Allergic/Immunologic: Negative  Neurological: Negative  Hematological: Negative  Psychiatric/Behavioral: Negative  Physical Exam:  /70   Pulse 79   Ht 5' 4" (1 626 m)   Wt 110 kg (242 lb)   BMI 41 54 kg/m²     PREVIOUS WEIGHTS:   Wt Readings from Last 10 Encounters:   22 110 kg (242 lb)   22 110 kg (242 lb 14 4 oz)   02/10/22 110 kg (243 lb)   22 110 kg (242 lb 9 6 oz)   21 109 kg (239 lb 6 4 oz)   21 109 kg (241 lb 3 2 oz)   21 102 kg (225 lb)   02/10/20 102 kg (224 lb 9 6 oz)   13 93 2 kg (205 lb 6 1 oz)   13 90 9 kg (200 lb 8 oz)      Physical Exam  Constitutional:       General: She is not in acute distress  Appearance: She is well-developed  HENT:      Head: Normocephalic and atraumatic  Neck:      Thyroid: No thyromegaly  Vascular: No carotid bruit or JVD  Trachea: No tracheal deviation  Cardiovascular:      Rate and Rhythm: Normal rate and regular rhythm  Pulses: Normal pulses  Heart sounds: Normal heart sounds  No murmur heard  No friction rub  No gallop  Pulmonary:      Effort: Pulmonary effort is normal  No respiratory distress  Breath sounds: Normal breath sounds  No wheezing, rhonchi or rales     Chest:      Chest wall: No tenderness  Abdominal:      General: Bowel sounds are normal  There is no distension  Palpations: Abdomen is soft  Tenderness: There is no abdominal tenderness  Musculoskeletal:         General: Normal range of motion  Cervical back: Normal range of motion and neck supple  Right lower leg: No edema  Left lower leg: No edema  Skin:     General: Skin is warm and dry  Neurological:      General: No focal deficit present  Mental Status: She is alert and oriented to person, place, and time  Gait: Gait normal    Psychiatric:         Mood and Affect: Mood normal          Behavior: Behavior normal          Thought Content: Thought content normal          Judgment: Judgment normal        ======================================================   I have personally reviewed pertinent reports  Portions of the record may have been created with voice recognition software  Occasional wrong word or "sound a like" substitutions may have occurred due to the inherent limitations of voice recognition software  Read the chart carefully and recognize, using context, where substitutions have occurred      SIGNATURES:   Mckayla Mena MD

## 2022-04-13 ENCOUNTER — ANESTHESIA EVENT (OUTPATIENT)
Dept: GASTROENTEROLOGY | Facility: HOSPITAL | Age: 52
End: 2022-04-13

## 2022-04-13 ENCOUNTER — HOSPITAL ENCOUNTER (OUTPATIENT)
Dept: GASTROENTEROLOGY | Facility: HOSPITAL | Age: 52
Setting detail: OUTPATIENT SURGERY
Discharge: HOME | End: 2022-04-13
Attending: SURGERY | Admitting: SURGERY
Payer: COMMERCIAL

## 2022-04-13 ENCOUNTER — ANESTHESIA (OUTPATIENT)
Dept: GASTROENTEROLOGY | Facility: HOSPITAL | Age: 52
End: 2022-04-13

## 2022-04-13 VITALS
HEART RATE: 81 BPM | RESPIRATION RATE: 18 BRPM | TEMPERATURE: 98 F | DIASTOLIC BLOOD PRESSURE: 72 MMHG | OXYGEN SATURATION: 95 % | SYSTOLIC BLOOD PRESSURE: 121 MMHG

## 2022-04-13 DIAGNOSIS — E66.01 MORBID OBESITY (HCC): ICD-10-CM

## 2022-04-13 PROCEDURE — 88305 TISSUE EXAM BY PATHOLOGIST: CPT | Performed by: PATHOLOGY

## 2022-04-13 PROCEDURE — 43239 EGD BIOPSY SINGLE/MULTIPLE: CPT | Performed by: SURGERY

## 2022-04-13 RX ORDER — SODIUM CHLORIDE 9 MG/ML
125 INJECTION, SOLUTION INTRAVENOUS CONTINUOUS
Status: DISCONTINUED | OUTPATIENT
Start: 2022-04-13 | End: 2022-04-17 | Stop reason: HOSPADM

## 2022-04-13 RX ORDER — PROPOFOL 10 MG/ML
INJECTION, EMULSION INTRAVENOUS AS NEEDED
Status: DISCONTINUED | OUTPATIENT
Start: 2022-04-13 | End: 2022-04-13

## 2022-04-13 RX ADMIN — SODIUM CHLORIDE: 0.9 INJECTION, SOLUTION INTRAVENOUS at 11:17

## 2022-04-13 RX ADMIN — PROPOFOL 150 MG: 10 INJECTION, EMULSION INTRAVENOUS at 11:23

## 2022-04-13 NOTE — INTERVAL H&P NOTE
H&P reviewed  After examining the patient I find no changes in the patients condition since the H&P had been written      Vitals:    04/13/22 1116   BP: 138/68   Pulse: 68   Resp: 17   Temp: 98 °F (36 7 °C)   SpO2: 98%

## 2022-04-13 NOTE — ANESTHESIA PREPROCEDURE EVALUATION
Procedure:  EGD    Relevant Problems   ANESTHESIA (within normal limits)      CARDIO (within normal limits)      ENDO  BMI 45       GI/HEPATIC (within normal limits)      /RENAL  stress incontinence       MUSCULOSKELETAL (within normal limits)      PULMONARY (within normal limits)        Physical Exam    Airway    Mallampati score: II  TM Distance: >3 FB  Neck ROM: full     Dental       Cardiovascular  Rhythm: regular, Rate: normal, Cardiovascular exam normal    Pulmonary  Pulmonary exam normal     Other Findings        Anesthesia Plan  ASA Score- 2     Anesthesia Type- general with ASA Monitors  Additional Monitors:   Airway Plan:           Plan Factors-    Chart reviewed  Existing labs reviewed  Patient summary reviewed  Patient is not a current smoker  Induction- intravenous  Postoperative Plan-     Informed Consent- Anesthetic plan and risks discussed with patient

## 2022-04-13 NOTE — ANESTHESIA POSTPROCEDURE EVALUATION
Post-Op Assessment Note    CV Status:  Stable  Pain Score: 0    Pain management: adequate     Mental Status:  Alert and awake   Hydration Status:  Euvolemic   PONV Controlled:  Controlled   Airway Patency:  Patent      Post Op Vitals Reviewed: Yes      Staff: Anesthesiologist         No complications documented      BP      Temp      Pulse     Resp      SpO2      /72   Pulse 81   Temp 98 °F (36 7 °C) (Temporal)   Resp 18   SpO2 95%

## 2022-04-15 ENCOUNTER — OFFICE VISIT (OUTPATIENT)
Dept: BARIATRICS | Facility: CLINIC | Age: 52
End: 2022-04-15

## 2022-04-15 VITALS — WEIGHT: 242.1 LBS | BODY MASS INDEX: 41.56 KG/M2

## 2022-04-15 DIAGNOSIS — E66.01 OBESITY, CLASS III, BMI 40-49.9 (MORBID OBESITY) (HCC): Primary | ICD-10-CM

## 2022-04-15 PROCEDURE — RECHECK: Performed by: DIETITIAN, REGISTERED

## 2022-04-15 NOTE — PROGRESS NOTES
Bariatric Nutrition Follow-Up Note    Type of surgery    Preop 4 months: 4 of 4 today  Surgery Date: TBD- Tentative May 2022  Surgeon: Dr Alejo Keene R Artim  49 y o   female     Wt with BMI of 25: 148 2lbs  Pre-Op Excess Wt: 91 2lbs  Wt 110 kg (242 lb 1 6 oz)   BMI 41 56 kg/m²    0 8lb wt loss from last month  2 7lb wt gain from initial visit      [facility] Equation:     Weight maintenance=  kcal/day  Estimated calories for weight loss 3753-5607 kcal/day ( 1-2# per wk wt loss - sedentary )  Estimated protein needs 67 4-80 8 g/day(1 0-1 2 gms/kg IBW )   Estimated fluid needs 9586-8712 ml/day (30-35 ml/kg IBW )      Review of History and Medications   Past Medical History:   Diagnosis Date    Asthma     Migraines     Stress incontinence 2021     Past Surgical History:   Procedure Laterality Date     SECTION       Social History     Socioeconomic History    Marital status: /Civil Union     Spouse name: Not on file    Number of children: Not on file    Years of education: Not on file    Highest education level: Not on file   Occupational History    Not on file   Tobacco Use    Smoking status: Never Smoker    Smokeless tobacco: Never Used   Vaping Use    Vaping Use: Never used   Substance and Sexual Activity    Alcohol use: Never     Comment: occasional    Drug use: Never    Sexual activity: Yes     Partners: Male   Other Topics Concern    Not on file   Social History Narrative    Not on file     Social Determinants of Health     Financial Resource Strain: Not on file   Food Insecurity: Not on file   Transportation Needs: Not on file   Physical Activity: Not on file   Stress: Not on file   Social Connections: Not on file   Intimate Partner Violence: Not on file   Housing Stability: Not on file       Current Outpatient Medications:     albuterol (PROVENTIL HFA,VENTOLIN HFA) 90 mcg/act inhaler, 2 puffs every 6 (six) hours as needed, Disp: , Rfl:     cholecalciferol (VITAMIN D3) 1,000 units tablet, Take 1,000 Units by mouth daily, Disp: , Rfl:     naproxen sodium (ANAPROX DS) 550 mg tablet, Take 1 mg by mouth as needed As needed for migraine , Disp: , Rfl:   No current facility-administered medications for this visit  Facility-Administered Medications Ordered in Other Visits:     sodium chloride 0 9 % infusion, 125 mL/hr, Intravenous, Continuous, Willam Brenner DO, New Bag at 04/13/22 1117     Food Intake and Lifestyle Assessment   Food Intake Assessment completed via usual food recall:  Breakfast:  Eggs  Lunch:  Protein, salad  Dinner, Protein, vegetable, starch  Snack:  Protein bar  Beverage intake: water, diet soda, coffee/tea and unsweetened iced tea  Protein supplement: Protein snack bar  Estimated protein intake per day: 50-60g  Estimated fluid intake per day: two 16 oz water, 2 cup coffee, 8 oz diet soda, glass water before bed  Meals eaten away from home: 1 per week per pt report  Typical meal pattern: 3 meals per day and 1 snacks per day  Eating Behaviors: Consumption of high calorie/ high fat foods, Large portion sizes and Craves salty foods  Pt has been eating three meals and one snack per day  Pt is eating protein with each meal   Pt purchased measuring cups and portioned food containers  Pt is practicing the 30/60 rule  Pt is working on sipping water throughout her work day and is weaning herself off of the diet soda  Food allergies or intolerances: No Known Allergies NKFA  Bananas upset stomach  Cultural or Anabaptist considerations: none    Physical Assessment  Physical Activity  Types of exercise: None  Has treadmill at home  Likes to hike and [facility] [employer] and crossfit  Pt plans on going to her husbands' crossfit gym    Current physical limitations: joint pains, knee pains    Psychosocial Assessment   Support systems: lives with spouse and adult child who are supportive  Socioeconomic factors: travels frequently for work  Nutrition Diagnosis-continued  Diagnosis: Overweight / Obesity (NC-3 3), Excessive energy intake (NI-1 5) and Undesirable food choices (NB-1 7)  Related to: Physical inactivity and Excessive energy intake  As Evidenced by: BMI >25, Excessive energy intake and Unintentional weight gain     Interventions and Teaching   Discussed pre-op and post-op nutrition guidelines  Patient educated and handouts provided  Surgical changes to stomach / GI  Capacity of post-surgery stomach  Diet progression  Adequate hydration  Sugar and fat restriction to decrease "dumping syndrome"  Expected weight loss  Weight loss plateaus/ possibility of weight regain  Exercise  Suggestions for pre-op diet  Nutrition considerations after surgery  Protein supplements  Meal planning and preparation  Appropriate carbohydrate, protein, and fat intake, and food/fluid choices to maximize safe weight loss, nutrient intake, and tolerance   Dietary and lifestyle changes  Possible problems with poor eating habits  Techniques for self monitoring and keeping daily food journal  Potential for food intolerance after surgery, and ways to deal with them including: lactose intolerance, nausea, reflux, vomiting, diarrhea, food intolerance, appetite changes, gas  Vitamin / Mineral supplementation of Multivitamin with minerals and Vitamin D  Currently takes vitamin D 1000 IU  [facility] Vitamin D deficiency  Pt has purchased post-op bariatric vitamins:  [employer] once daily chewable multivitamin with iron  Celebrate calcium citrate 500mg chewable      Education provided to: patient  Barriers to learning: No barriers identified  Readiness to change: action  Prior research on procedure: pre-op class and friends or family  Comprehension: needs reinforcement and verbalizes understanding   Expected Compliance: good    Evaluation / Monitoring  Dietitian to Monitor: Eating pattern as discussed Tolerance of nutrition prescription Body weight Lab values Physical activity Bowel pattern    Goals  Eliminate sugar sweetened beverages, Food journal, Exercise 30 minutes 5 times per week, Complete lession plans 1-6, Eat 3 meals per day and Eliminate mindless snacking    Workflow:   Bloodwork:   o CBC, CMP, HgbA1c, Lipid Panel done 10/22/21  CBC+CMP will need updated  o TSH ordered on 11/11/21 by EVE PA   o Pt plans on getting bloodwork done tomorrow   Weight Loss: [id number] 71#   4 Month Pre-Operative Program: 4 of 4 today   EGD done 4/13/22   Cardiac Risk Assessment: done 4/5/22      Time Spent:   30 minutes

## 2022-04-18 ENCOUNTER — APPOINTMENT (OUTPATIENT)
Dept: LAB | Facility: IMAGING CENTER | Age: 52
End: 2022-04-18
Payer: COMMERCIAL

## 2022-04-18 DIAGNOSIS — R63.5 ABNORMAL WEIGHT GAIN: ICD-10-CM

## 2022-04-18 DIAGNOSIS — Z01.812 BLOOD TESTS PRIOR TO TREATMENT OR PROCEDURE: ICD-10-CM

## 2022-04-18 DIAGNOSIS — Z01.818 PREOPERATIVE CLEARANCE: ICD-10-CM

## 2022-04-18 DIAGNOSIS — E66.01 OBESITY, CLASS III, BMI 40-49.9 (MORBID OBESITY) (HCC): ICD-10-CM

## 2022-04-18 LAB
ALBUMIN SERPL BCP-MCNC: 3.7 G/DL (ref 3.5–5)
ALP SERPL-CCNC: 95 U/L (ref 46–116)
ALT SERPL W P-5'-P-CCNC: 72 U/L (ref 12–78)
ANION GAP SERPL CALCULATED.3IONS-SCNC: 4 MMOL/L (ref 4–13)
AST SERPL W P-5'-P-CCNC: 34 U/L (ref 5–45)
BASOPHILS # BLD AUTO: 0.08 THOUSANDS/ΜL (ref 0–0.1)
BASOPHILS NFR BLD AUTO: 1 % (ref 0–1)
BILIRUB SERPL-MCNC: 0.5 MG/DL (ref 0.2–1)
BUN SERPL-MCNC: 12 MG/DL (ref 5–25)
CALCIUM SERPL-MCNC: 9.1 MG/DL (ref 8.3–10.1)
CHLORIDE SERPL-SCNC: 107 MMOL/L (ref 100–108)
CO2 SERPL-SCNC: 27 MMOL/L (ref 21–32)
CREAT SERPL-MCNC: 0.95 MG/DL (ref 0.6–1.3)
EOSINOPHIL # BLD AUTO: 0.76 THOUSAND/ΜL (ref 0–0.61)
EOSINOPHIL NFR BLD AUTO: 9 % (ref 0–6)
ERYTHROCYTE [DISTWIDTH] IN BLOOD BY AUTOMATED COUNT: 12.7 % (ref 11.6–15.1)
GFR SERPL CREATININE-BSD FRML MDRD: 70 ML/MIN/1.73SQ M
GLUCOSE P FAST SERPL-MCNC: 121 MG/DL (ref 65–99)
HCT VFR BLD AUTO: 41.3 % (ref 34.8–46.1)
HGB BLD-MCNC: 13.8 G/DL (ref 11.5–15.4)
IMM GRANULOCYTES # BLD AUTO: 0.03 THOUSAND/UL (ref 0–0.2)
IMM GRANULOCYTES NFR BLD AUTO: 0 % (ref 0–2)
INSULIN SERPL-ACNC: 14.3 MU/L (ref 3–25)
LYMPHOCYTES # BLD AUTO: 2.97 THOUSANDS/ΜL (ref 0.6–4.47)
LYMPHOCYTES NFR BLD AUTO: 35 % (ref 14–44)
MCH RBC QN AUTO: 29.4 PG (ref 26.8–34.3)
MCHC RBC AUTO-ENTMCNC: 33.4 G/DL (ref 31.4–37.4)
MCV RBC AUTO: 88 FL (ref 82–98)
MONOCYTES # BLD AUTO: 0.79 THOUSAND/ΜL (ref 0.17–1.22)
MONOCYTES NFR BLD AUTO: 9 % (ref 4–12)
NEUTROPHILS # BLD AUTO: 3.93 THOUSANDS/ΜL (ref 1.85–7.62)
NEUTS SEG NFR BLD AUTO: 46 % (ref 43–75)
NRBC BLD AUTO-RTO: 0 /100 WBCS
PLATELET # BLD AUTO: 353 THOUSANDS/UL (ref 149–390)
PMV BLD AUTO: 10.5 FL (ref 8.9–12.7)
POTASSIUM SERPL-SCNC: 4.4 MMOL/L (ref 3.5–5.3)
PROT SERPL-MCNC: 7.5 G/DL (ref 6.4–8.2)
RBC # BLD AUTO: 4.69 MILLION/UL (ref 3.81–5.12)
SODIUM SERPL-SCNC: 138 MMOL/L (ref 136–145)
TSH SERPL DL<=0.05 MIU/L-ACNC: 3.52 UIU/ML (ref 0.45–4.5)
WBC # BLD AUTO: 8.56 THOUSAND/UL (ref 4.31–10.16)

## 2022-04-18 PROCEDURE — 36415 COLL VENOUS BLD VENIPUNCTURE: CPT

## 2022-04-18 PROCEDURE — 85025 COMPLETE CBC W/AUTO DIFF WBC: CPT

## 2022-04-18 PROCEDURE — 84443 ASSAY THYROID STIM HORMONE: CPT

## 2022-04-18 PROCEDURE — 83525 ASSAY OF INSULIN: CPT

## 2022-04-18 PROCEDURE — 80053 COMPREHEN METABOLIC PANEL: CPT

## 2022-04-19 ENCOUNTER — TELEPHONE (OUTPATIENT)
Dept: BARIATRICS | Facility: CLINIC | Age: 52
End: 2022-04-19

## 2022-04-19 NOTE — TELEPHONE ENCOUNTER
Patient returned phone call and reviewed her lab work with her  Patient understood and will contact the office with any questions or concerns

## 2022-04-19 NOTE — TELEPHONE ENCOUNTER
----- Message from Cisco Poe PA-C sent at 4/18/2022  1:40 PM EDT -----  Tsh and fasting insulin are within normal range

## 2022-04-21 ENCOUNTER — TELEPHONE (OUTPATIENT)
Dept: BARIATRICS | Facility: CLINIC | Age: 52
End: 2022-04-21

## 2022-04-25 ENCOUNTER — PREP FOR PROCEDURE (OUTPATIENT)
Dept: BARIATRICS | Facility: CLINIC | Age: 52
End: 2022-04-25

## 2022-04-25 DIAGNOSIS — E66.01 MORBID OBESITY (HCC): Primary | ICD-10-CM

## 2022-05-09 RX ORDER — ALBUTEROL SULFATE 2.5 MG/3ML
2.5 SOLUTION RESPIRATORY (INHALATION) ONCE AS NEEDED
Status: CANCELLED | OUTPATIENT
Start: 2022-05-09

## 2022-05-10 ENCOUNTER — HOSPITAL ENCOUNTER (OUTPATIENT)
Dept: PULMONOLOGY | Facility: HOSPITAL | Age: 52
Discharge: HOME | End: 2022-05-10
Payer: COMMERCIAL

## 2022-05-10 DIAGNOSIS — J45.909 UNCOMPLICATED ASTHMA, UNSPECIFIED ASTHMA SEVERITY, UNSPECIFIED WHETHER PERSISTENT: ICD-10-CM

## 2022-05-10 DIAGNOSIS — J45.909 UNSPECIFIED ASTHMA, UNCOMPLICATED: ICD-10-CM

## 2022-05-10 PROCEDURE — 94760 N-INVAS EAR/PLS OXIMETRY 1: CPT

## 2022-05-10 PROCEDURE — 94726 PLETHYSMOGRAPHY LUNG VOLUMES: CPT | Performed by: INTERNAL MEDICINE

## 2022-05-10 PROCEDURE — 94729 DIFFUSING CAPACITY: CPT

## 2022-05-10 PROCEDURE — 94010 BREATHING CAPACITY TEST: CPT | Performed by: INTERNAL MEDICINE

## 2022-05-10 PROCEDURE — 94726 PLETHYSMOGRAPHY LUNG VOLUMES: CPT

## 2022-05-10 PROCEDURE — 94729 DIFFUSING CAPACITY: CPT | Performed by: INTERNAL MEDICINE

## 2022-05-10 PROCEDURE — 94010 BREATHING CAPACITY TEST: CPT

## 2022-06-02 ENCOUNTER — CLINICAL SUPPORT (OUTPATIENT)
Dept: BARIATRICS | Facility: CLINIC | Age: 52
End: 2022-06-02

## 2022-06-02 ENCOUNTER — OFFICE VISIT (OUTPATIENT)
Dept: BARIATRICS | Facility: CLINIC | Age: 52
End: 2022-06-02
Payer: COMMERCIAL

## 2022-06-02 VITALS
BODY MASS INDEX: 40.32 KG/M2 | WEIGHT: 242 LBS | HEART RATE: 96 BPM | TEMPERATURE: 97.9 F | SYSTOLIC BLOOD PRESSURE: 118 MMHG | HEIGHT: 65 IN | DIASTOLIC BLOOD PRESSURE: 76 MMHG

## 2022-06-02 DIAGNOSIS — E66.01 MORBID (SEVERE) OBESITY DUE TO EXCESS CALORIES (HCC): Primary | ICD-10-CM

## 2022-06-02 DIAGNOSIS — E66.01 OBESITY, CLASS III, BMI 40-49.9 (MORBID OBESITY) (HCC): Primary | ICD-10-CM

## 2022-06-02 PROCEDURE — 99213 OFFICE O/P EST LOW 20 MIN: CPT | Performed by: SURGERY

## 2022-06-02 PROCEDURE — RECHECK: Performed by: DIETITIAN, REGISTERED

## 2022-06-02 RX ORDER — ENOXAPARIN SODIUM 100 MG/ML
40 INJECTION SUBCUTANEOUS
Status: CANCELLED | OUTPATIENT
Start: 2022-06-14 | End: 2022-06-15

## 2022-06-02 RX ORDER — ACETAMINOPHEN 325 MG/1
975 TABLET ORAL ONCE
Status: CANCELLED | OUTPATIENT
Start: 2022-06-14 | End: 2022-06-02

## 2022-06-02 RX ORDER — METRONIDAZOLE 500 MG/100ML
500 INJECTION, SOLUTION INTRAVENOUS ONCE
Status: CANCELLED | OUTPATIENT
Start: 2022-06-02 | End: 2022-06-02

## 2022-06-02 RX ORDER — CEFAZOLIN SODIUM 2 G/50ML
2000 SOLUTION INTRAVENOUS EVERY 8 HOURS
Status: CANCELLED | OUTPATIENT
Start: 2022-06-14

## 2022-06-02 RX ORDER — CEFAZOLIN SODIUM 2 G/50ML
2000 SOLUTION INTRAVENOUS ONCE
Status: CANCELLED | OUTPATIENT
Start: 2022-06-02 | End: 2022-06-02

## 2022-06-02 RX ORDER — CELECOXIB 200 MG/1
200 CAPSULE ORAL ONCE
Status: CANCELLED | OUTPATIENT
Start: 2022-06-14 | End: 2022-06-02

## 2022-06-02 RX ORDER — SCOLOPAMINE TRANSDERMAL SYSTEM 1 MG/1
1 PATCH, EXTENDED RELEASE TRANSDERMAL ONCE
Status: CANCELLED | OUTPATIENT
Start: 2022-06-14 | End: 2022-06-02

## 2022-06-02 RX ORDER — GABAPENTIN 300 MG/1
300 CAPSULE ORAL ONCE
Status: CANCELLED | OUTPATIENT
Start: 2022-06-14 | End: 2022-06-02

## 2022-06-02 NOTE — H&P (VIEW-ONLY)
BARIATRIC HISTORY AND PHYSICAL - BARIATRIC SURGERY  Cecy Martin 49 y o  female MRN: [id number]  Unit/Bed#:  Encounter: [id number]      HPI:  Aamir Allison is a 49 y o  female who presents to review their preoperative workup and see if they are a good candidate to undergo a bariatric procedure  Review of Systems   Constitutional: Negative  HENT: Negative  Eyes: Negative  Respiratory: Negative  Cardiovascular: Negative  Gastrointestinal: Negative  Endocrine: Negative  Genitourinary: Negative  Musculoskeletal: Negative  Skin: Negative  Allergic/Immunologic: Negative  Neurological: Negative  Hematological: Negative  Psychiatric/Behavioral: Negative  All other systems reviewed and are negative  Historical Information   Past Medical History:   Diagnosis Date    Asthma     Migraines     Stress incontinence 2021     Past Surgical History:   Procedure Laterality Date     SECTION       Social History   Social History     Substance and Sexual Activity   Alcohol Use Never    Comment: occasional     Social History     Substance and Sexual Activity   Drug Use Never     Social History     Tobacco Use   Smoking Status Never Smoker   Smokeless Tobacco Never Used     Family History:   Family History   Problem Relation Age of Onset    Lung cancer Mother     Heart disease Mother     Diabetes Father     Cancer Maternal Uncle         bladder    Stroke Maternal Uncle     Diabetes Maternal Uncle     Heart disease Maternal Uncle     Diabetes Paternal Uncle     Heart disease Maternal Grandmother     Heart disease Maternal Grandfather     Hypertension Neg Hx     Thyroid disease Neg Hx        Meds/Allergies   all medications and allergies reviewed  No Known Allergies    Objective     Current Vitals:      bowel movement  [unfilled]    Invasive Devices  Report    None                 Physical Exam  Vitals and nursing note reviewed     Constitutional: Appearance: Normal appearance  HENT:      Head: Normocephalic and atraumatic  Nose: Nose normal       Mouth/Throat:      Mouth: Mucous membranes are moist       Pharynx: Oropharynx is clear  Eyes:      Extraocular Movements: Extraocular movements intact  Pupils: Pupils are equal, round, and reactive to light  Cardiovascular:      Rate and Rhythm: Normal rate and regular rhythm  Pulses: Normal pulses  Pulmonary:      Effort: Pulmonary effort is normal    Abdominal:      General: Abdomen is flat  Bowel sounds are normal       Palpations: Abdomen is soft  Comments: pfannenstiel incision   Musculoskeletal:         General: Normal range of motion  Cervical back: Normal range of motion and neck supple  Skin:     General: Skin is warm and dry  Neurological:      General: No focal deficit present  Mental Status: She is alert and oriented to person, place, and time  Mental status is at baseline  Psychiatric:         Mood and Affect: Mood normal          Behavior: Behavior normal          Thought Content: Thought content normal          Judgment: Judgment normal          Lab Results: I have personally reviewed pertinent lab results  Imaging: I have personally reviewed pertinent reports  EKG, Pathology, and Other Studies: I have personally reviewed pertinent reports  Code Status: @[facility]@  Advance Directive and Living Will:      Power of :    POLST:      DATE OF SERVICE:  4/13/22     PHYSICIAN(S):  Attending:   MD Chana Waddell MD      Fellow:   No Staff Documented         INDICATION:  Morbid obesity ([address] )     POST-OP DIAGNOSIS:  See the impression below      PREPROCEDURE:  Informed consent was obtained for the procedure, including sedation  Risks of perforation, hemorrhage, adverse drug reaction and aspiration were discussed   The patient was placed in the left lateral decubitus position      Patient was explained about the risks and benefits of the procedure  Risks including but not limited to bleeding, infection, and perforation were explained in detail  Also explained about less than 100% sensitivity with the exam and other alternatives      DETAILS OF PROCEDURE:  Patient was taken to the procedure room where a time out was performed to confirm correct patient and correct procedure  The patient underwent monitored anesthesia care, which was administered by an anesthesia professional  The patient's blood pressure, heart rate, level of consciousness, respirations and oxygen were monitored throughout the procedure  The scope was advanced to the second part of the duodenum  Retroflexion was performed in the fundus  The patient experienced no blood loss  The procedure was not difficult  The patient tolerated the procedure well  There were no apparent complications       ANESTHESIA INFORMATION:  ASA: II  Anesthesia Type: Anesthesia type not filed in the log      MEDICATIONS:  No administrations occurring from 1114 to 1127 on 04/13/22         FINDINGS:  · Regular Z-line 36 cm from the incisors  · Performed single biopsy to rule out H  pylori in the antrum  · The esophagus, stomach and 2nd part of the duodenum appeared normal         SPECIMENS:  ID Type Source Tests Collected by Time Destination   1 : bx r/o h pylori Tissue Stomach TISSUE EXAM Tayla Reynoso MD 4/13/2022 1124              IMPRESSION:  Normal     RECOMMENDATION:  There is no recommended follow-up for this procedure  Assessment/Plan:    The patient presented to review the preoperative workup and see if bariatric surgery is appropriate and indicated following the extensive preoperative workup and the enrollment in our weight loss program   Preoperative workup was complete  Results were reviewed with the patient including the blood work results and the endoscopy findings and the biopsy results  We also reviewed the cardiology evaluation      The patient was determined to be a good candidate for Robot Assisted Sleeve Gastrectomy  ----------------------------------------------------------------------  Smoking: Denies  Blood thinner use: Denies  Home pain medication use and who manages it: Denies  CPAP use: Denies (If yes, sleep study obtained and reminded pt to bring CPAP to hospital)  History of blood clots: Denies  Previous abdominal surgeries:   Cardiac clearance obtained: 22   EKG performed: 22, NSR  EGD prior to surgery: 22  Screening Labs obtained (CBC, CMP): yes, 22  H  Pylori status: negative  Medication allergies: NKA  SLIM consult Post-Op: No  Reminded patient about 2 week pre-op liquid diet: N/A  10% body weight loss pre-operatively met/discussed: N/A  Consent signed: yes  Pre-Op ERAS Orders placed: yes  -----------------------------------------------------------------------  Risks and benefits explained one more time to the patient  Alternatives to surgery and alternative forms of surgery were also explained  Post-surgical commitment and after care programs were explained  We also discussed that the [employer] robot may be available to us to use on the day of the patient procedure and that the procedure may be performed robotically  I discussed in details the advantages and disadvantages of this approach including the potential decrease in postoperative pain because of the remote center technology  I also mentioned the lack of strong evidence for the use of robot in bariatric patients and the potential disadvantage to patients because of the prolonged operative time  Consent was signed  Questions were answered and concerns were addressed  Patient will need to start the 2 week liquid diet prior to surgery  Patient wishes to proceed  As per [address] [place] [address] guidelines, I had a discussion with the patient regarding their CODE STATUS in the perioperative period and the patient is level 1 or FULL CODE STATUS

## 2022-06-02 NOTE — PROGRESS NOTES
Pt attended pre-op education session  Standardized packet of information for bariatric surgery was sent via email and was reviewed with pt  Importance of lifestyle change and development of regular exercise routine stressed  Pt given the opportunity to ask questions  Ensure pre-surgery drink instructions were given  Questions were answered  Pt verbalized understanding of all information provided  Pt appeared prepared for upcoming surgery  Pt  educated on two-week pre operative liver shrinking diet  Pt understands that the diet needs to be followed for 2 weeks prior to surgery  Handout reviewed  Emphasized the need to drink 80 ounces of fluid per day while on the diet  Reviewed pre-op ERAS drink, post-operative clear liquid, full liquid, and pureed diet, post-operative nutrition rules and facts, and post-operative bariatric multivitamin/mineral recommendations and brand comparison  Contact information provided for any questions/concerns

## 2022-06-02 NOTE — PROGRESS NOTES
BARIATRIC HISTORY AND PHYSICAL - BARIATRIC SURGERY  Henrietta Martin 49 y o  female MRN: [id number]  Unit/Bed#:  Encounter: [id number]      HPI:  Dee Carballo is a 49 y o  female who presents to review their preoperative workup and see if they are a good candidate to undergo a bariatric procedure  Review of Systems   Constitutional: Negative  HENT: Negative  Eyes: Negative  Respiratory: Negative  Cardiovascular: Negative  Gastrointestinal: Negative  Endocrine: Negative  Genitourinary: Negative  Musculoskeletal: Negative  Skin: Negative  Allergic/Immunologic: Negative  Neurological: Negative  Hematological: Negative  Psychiatric/Behavioral: Negative  All other systems reviewed and are negative  Historical Information   Past Medical History:   Diagnosis Date    Asthma     Migraines     Stress incontinence 2021     Past Surgical History:   Procedure Laterality Date     SECTION       Social History   Social History     Substance and Sexual Activity   Alcohol Use Never    Comment: occasional     Social History     Substance and Sexual Activity   Drug Use Never     Social History     Tobacco Use   Smoking Status Never Smoker   Smokeless Tobacco Never Used     Family History:   Family History   Problem Relation Age of Onset    Lung cancer Mother     Heart disease Mother     Diabetes Father     Cancer Maternal Uncle         bladder    Stroke Maternal Uncle     Diabetes Maternal Uncle     Heart disease Maternal Uncle     Diabetes Paternal Uncle     Heart disease Maternal Grandmother     Heart disease Maternal Grandfather     Hypertension Neg Hx     Thyroid disease Neg Hx        Meds/Allergies   all medications and allergies reviewed  No Known Allergies    Objective     Current Vitals:      bowel movement  [unfilled]    Invasive Devices  Report    None                 Physical Exam  Vitals and nursing note reviewed     Constitutional: Appearance: Normal appearance  HENT:      Head: Normocephalic and atraumatic  Nose: Nose normal       Mouth/Throat:      Mouth: Mucous membranes are moist       Pharynx: Oropharynx is clear  Eyes:      Extraocular Movements: Extraocular movements intact  Pupils: Pupils are equal, round, and reactive to light  Cardiovascular:      Rate and Rhythm: Normal rate and regular rhythm  Pulses: Normal pulses  Pulmonary:      Effort: Pulmonary effort is normal    Abdominal:      General: Abdomen is flat  Bowel sounds are normal       Palpations: Abdomen is soft  Comments: pfannenstiel incision   Musculoskeletal:         General: Normal range of motion  Cervical back: Normal range of motion and neck supple  Skin:     General: Skin is warm and dry  Neurological:      General: No focal deficit present  Mental Status: She is alert and oriented to person, place, and time  Mental status is at baseline  Psychiatric:         Mood and Affect: Mood normal          Behavior: Behavior normal          Thought Content: Thought content normal          Judgment: Judgment normal          Lab Results: I have personally reviewed pertinent lab results  Imaging: I have personally reviewed pertinent reports  EKG, Pathology, and Other Studies: I have personally reviewed pertinent reports  Code Status: @[facility]@  Advance Directive and Living Will:      Power of :    POLST:      DATE OF SERVICE:  4/13/22     PHYSICIAN(S):  Attending:   MD Lorin Herrera MD      Fellow:   No Staff Documented         INDICATION:  Morbid obesity ([address] )     POST-OP DIAGNOSIS:  See the impression below      PREPROCEDURE:  Informed consent was obtained for the procedure, including sedation  Risks of perforation, hemorrhage, adverse drug reaction and aspiration were discussed   The patient was placed in the left lateral decubitus position      Patient was explained about the risks and benefits of the procedure  Risks including but not limited to bleeding, infection, and perforation were explained in detail  Also explained about less than 100% sensitivity with the exam and other alternatives      DETAILS OF PROCEDURE:  Patient was taken to the procedure room where a time out was performed to confirm correct patient and correct procedure  The patient underwent monitored anesthesia care, which was administered by an anesthesia professional  The patient's blood pressure, heart rate, level of consciousness, respirations and oxygen were monitored throughout the procedure  The scope was advanced to the second part of the duodenum  Retroflexion was performed in the fundus  The patient experienced no blood loss  The procedure was not difficult  The patient tolerated the procedure well  There were no apparent complications       ANESTHESIA INFORMATION:  ASA: II  Anesthesia Type: Anesthesia type not filed in the log      MEDICATIONS:  No administrations occurring from 1114 to 1127 on 04/13/22         FINDINGS:  · Regular Z-line 36 cm from the incisors  · Performed single biopsy to rule out H  pylori in the antrum  · The esophagus, stomach and 2nd part of the duodenum appeared normal         SPECIMENS:  ID Type Source Tests Collected by Time Destination   1 : bx r/o h pylori Tissue Stomach TISSUE EXAM Sarah Landeros MD 4/13/2022 1124              IMPRESSION:  Normal     RECOMMENDATION:  There is no recommended follow-up for this procedure  Assessment/Plan:    The patient presented to review the preoperative workup and see if bariatric surgery is appropriate and indicated following the extensive preoperative workup and the enrollment in our weight loss program   Preoperative workup was complete  Results were reviewed with the patient including the blood work results and the endoscopy findings and the biopsy results  We also reviewed the cardiology evaluation      The patient was determined to be a good candidate for Robot Assisted Sleeve Gastrectomy  ----------------------------------------------------------------------  Smoking: Denies  Blood thinner use: Denies  Home pain medication use and who manages it: Denies  CPAP use: Denies (If yes, sleep study obtained and reminded pt to bring CPAP to hospital)  History of blood clots: Denies  Previous abdominal surgeries:   Cardiac clearance obtained: 22   EKG performed: 22, NSR  EGD prior to surgery: 22  Screening Labs obtained (CBC, CMP): yes, 22  H  Pylori status: negative  Medication allergies: NKA  SLIM consult Post-Op: No  Reminded patient about 2 week pre-op liquid diet: N/A  10% body weight loss pre-operatively met/discussed: N/A  Consent signed: yes  Pre-Op ERAS Orders placed: yes  -----------------------------------------------------------------------  Risks and benefits explained one more time to the patient  Alternatives to surgery and alternative forms of surgery were also explained  Post-surgical commitment and after care programs were explained  We also discussed that the [employer] robot may be available to us to use on the day of the patient procedure and that the procedure may be performed robotically  I discussed in details the advantages and disadvantages of this approach including the potential decrease in postoperative pain because of the remote center technology  I also mentioned the lack of strong evidence for the use of robot in bariatric patients and the potential disadvantage to patients because of the prolonged operative time  Consent was signed  Questions were answered and concerns were addressed  Patient will need to start the 2 week liquid diet prior to surgery  Patient wishes to proceed  As per [address] [place] [address] guidelines, I had a discussion with the patient regarding their CODE STATUS in the perioperative period and the patient is level 1 or FULL CODE STATUS

## 2022-06-03 DIAGNOSIS — E66.01 OBESITY, CLASS III, BMI 40-49.9 (MORBID OBESITY) (HCC): Primary | ICD-10-CM

## 2022-06-03 RX ORDER — OXYCODONE HYDROCHLORIDE 5 MG/1
5 TABLET ORAL EVERY 4 HOURS PRN
Qty: 10 TABLET | Refills: 0 | Status: SHIPPED | OUTPATIENT
Start: 2022-06-15

## 2022-06-03 RX ORDER — ENOXAPARIN SODIUM 100 MG/ML
40 INJECTION SUBCUTANEOUS DAILY
Qty: 5.2 ML | Refills: 0 | Status: SHIPPED | OUTPATIENT
Start: 2022-06-15 | End: 2022-06-28

## 2022-06-03 RX ORDER — OMEPRAZOLE 20 MG/1
20 CAPSULE, DELAYED RELEASE ORAL DAILY
Qty: 30 CAPSULE | Refills: 3 | Status: SHIPPED | OUTPATIENT
Start: 2022-06-15

## 2022-06-03 NOTE — PRE-PROCEDURE INSTRUCTIONS
Pre-Surgery Instructions:   Medication Instructions    albuterol (PROVENTIL HFA,VENTOLIN HFA) 90 mcg/act inhaler Uses PRN- OK to take day of surgery    cholecalciferol (VITAMIN D3) 1,000 units tablet Stop taking 7 days prior to surgery  Covid screening negative as per patient  Reviewed pre op medicine and showering instructions with patient via phone call, verbalizes understanding  Advised patient to stop taking non prescribed vitamins, herbal meds and ASA 7 days pre op  Advised to stop taking NSAID's 3 days pre op but may take Tylenol products if needed  Advised to take DOS medicine with a small sip water  Advised NPO after MN prior to surgery and surgical services will call (6/13) with scheduled time of hospital arrival       Aware of 3 carb drinks for pre op as instructed by surgeon's office  Pt verbalized understanding of all instructions

## 2022-06-07 ENCOUNTER — TELEPHONE (OUTPATIENT)
Dept: BARIATRICS | Facility: CLINIC | Age: 52
End: 2022-06-07

## 2022-06-10 ENCOUNTER — ANESTHESIA EVENT (OUTPATIENT)
Dept: PERIOP | Facility: HOSPITAL | Age: 52
DRG: 621 | End: 2022-06-10
Payer: COMMERCIAL

## 2022-06-14 ENCOUNTER — ANESTHESIA (OUTPATIENT)
Dept: PERIOP | Facility: HOSPITAL | Age: 52
DRG: 621 | End: 2022-06-14
Payer: COMMERCIAL

## 2022-06-14 ENCOUNTER — HOSPITAL ENCOUNTER (INPATIENT)
Facility: HOSPITAL | Age: 52
LOS: 1 days | Discharge: HOME | DRG: 621 | End: 2022-06-15
Attending: SURGERY | Admitting: SURGERY
Payer: COMMERCIAL

## 2022-06-14 DIAGNOSIS — E66.01 MORBID OBESITY (HCC): ICD-10-CM

## 2022-06-14 DIAGNOSIS — Z98.84 BARIATRIC SURGERY STATUS: Primary | ICD-10-CM

## 2022-06-14 PROBLEM — J45.909 ASTHMA: Status: ACTIVE | Noted: 2022-06-14

## 2022-06-14 PROCEDURE — 88307 TISSUE EXAM BY PATHOLOGIST: CPT | Performed by: PATHOLOGY

## 2022-06-14 PROCEDURE — 0DB64Z3 EXCISION OF STOMACH, PERCUTANEOUS ENDOSCOPIC APPROACH, VERTICAL: ICD-10-PCS | Performed by: SURGERY

## 2022-06-14 PROCEDURE — 43775 LAP SLEEVE GASTRECTOMY: CPT | Performed by: SURGERY

## 2022-06-14 PROCEDURE — 8E0W4CZ ROBOTIC ASSISTED PROCEDURE OF TRUNK REGION, PERCUTANEOUS ENDOSCOPIC APPROACH: ICD-10-PCS | Performed by: SURGERY

## 2022-06-14 PROCEDURE — S2900 ROBOTIC SURGICAL SYSTEM: HCPCS | Performed by: SURGERY

## 2022-06-14 PROCEDURE — C9290 INJ, BUPIVACAINE LIPOSOME: HCPCS | Performed by: SURGERY

## 2022-06-14 RX ORDER — ONDANSETRON 2 MG/ML
INJECTION INTRAMUSCULAR; INTRAVENOUS AS NEEDED
Status: DISCONTINUED | OUTPATIENT
Start: 2022-06-14 | End: 2022-06-14

## 2022-06-14 RX ORDER — EPHEDRINE SULFATE 50 MG/ML
INJECTION INTRAVENOUS AS NEEDED
Status: DISCONTINUED | OUTPATIENT
Start: 2022-06-14 | End: 2022-06-14

## 2022-06-14 RX ORDER — ONDANSETRON 2 MG/ML
4 INJECTION INTRAMUSCULAR; INTRAVENOUS ONCE AS NEEDED
Status: DISCONTINUED | OUTPATIENT
Start: 2022-06-14 | End: 2022-06-14 | Stop reason: HOSPADM

## 2022-06-14 RX ORDER — OXYCODONE HCL 5 MG/5 ML
5 SOLUTION, ORAL ORAL EVERY 4 HOURS PRN
Status: DISCONTINUED | OUTPATIENT
Start: 2022-06-14 | End: 2022-06-15 | Stop reason: HOSPADM

## 2022-06-14 RX ORDER — BUPIVACAINE HYDROCHLORIDE 5 MG/ML
INJECTION, SOLUTION PERINEURAL AS NEEDED
Status: DISCONTINUED | OUTPATIENT
Start: 2022-06-14 | End: 2022-06-14 | Stop reason: HOSPADM

## 2022-06-14 RX ORDER — PROPOFOL 10 MG/ML
INJECTION, EMULSION INTRAVENOUS AS NEEDED
Status: DISCONTINUED | OUTPATIENT
Start: 2022-06-14 | End: 2022-06-14

## 2022-06-14 RX ORDER — LABETALOL HYDROCHLORIDE 5 MG/ML
10 INJECTION, SOLUTION INTRAVENOUS ONCE
Status: COMPLETED | OUTPATIENT
Start: 2022-06-14 | End: 2022-06-14

## 2022-06-14 RX ORDER — PROMETHAZINE HYDROCHLORIDE 25 MG/ML
25 INJECTION, SOLUTION INTRAMUSCULAR; INTRAVENOUS EVERY 6 HOURS PRN
Status: DISCONTINUED | OUTPATIENT
Start: 2022-06-14 | End: 2022-06-15 | Stop reason: HOSPADM

## 2022-06-14 RX ORDER — GABAPENTIN 300 MG/1
300 CAPSULE ORAL ONCE
Status: COMPLETED | OUTPATIENT
Start: 2022-06-14 | End: 2022-06-14

## 2022-06-14 RX ORDER — ACETAMINOPHEN 325 MG/1
975 TABLET ORAL EVERY 8 HOURS
Status: DISCONTINUED | OUTPATIENT
Start: 2022-06-14 | End: 2022-06-15 | Stop reason: HOSPADM

## 2022-06-14 RX ORDER — GLYCOPYRROLATE 0.2 MG/ML
INJECTION INTRAMUSCULAR; INTRAVENOUS AS NEEDED
Status: DISCONTINUED | OUTPATIENT
Start: 2022-06-14 | End: 2022-06-14

## 2022-06-14 RX ORDER — SIMETHICONE 80 MG
80 TABLET,CHEWABLE ORAL 4 TIMES DAILY PRN
Status: DISCONTINUED | OUTPATIENT
Start: 2022-06-14 | End: 2022-06-15 | Stop reason: HOSPADM

## 2022-06-14 RX ORDER — MAGNESIUM HYDROXIDE 1200 MG/15ML
LIQUID ORAL AS NEEDED
Status: DISCONTINUED | OUTPATIENT
Start: 2022-06-14 | End: 2022-06-14 | Stop reason: HOSPADM

## 2022-06-14 RX ORDER — ENOXAPARIN SODIUM 100 MG/ML
40 INJECTION SUBCUTANEOUS
Status: COMPLETED | OUTPATIENT
Start: 2022-06-14 | End: 2022-06-14

## 2022-06-14 RX ORDER — MEPERIDINE HYDROCHLORIDE 25 MG/ML
12.5 INJECTION INTRAMUSCULAR; INTRAVENOUS; SUBCUTANEOUS
Status: DISCONTINUED | OUTPATIENT
Start: 2022-06-14 | End: 2022-06-14 | Stop reason: HOSPADM

## 2022-06-14 RX ORDER — CELECOXIB 200 MG/1
200 CAPSULE ORAL ONCE
Status: COMPLETED | OUTPATIENT
Start: 2022-06-14 | End: 2022-06-14

## 2022-06-14 RX ORDER — SODIUM CHLORIDE 9 MG/ML
INJECTION INTRAVENOUS AS NEEDED
Status: DISCONTINUED | OUTPATIENT
Start: 2022-06-14 | End: 2022-06-14 | Stop reason: HOSPADM

## 2022-06-14 RX ORDER — METOCLOPRAMIDE HYDROCHLORIDE 5 MG/ML
10 INJECTION INTRAMUSCULAR; INTRAVENOUS EVERY 6 HOURS PRN
Status: DISCONTINUED | OUTPATIENT
Start: 2022-06-14 | End: 2022-06-15 | Stop reason: HOSPADM

## 2022-06-14 RX ORDER — CEFAZOLIN SODIUM 2 G/50ML
2000 SOLUTION INTRAVENOUS EVERY 8 HOURS
Status: DISCONTINUED | OUTPATIENT
Start: 2022-06-14 | End: 2022-06-14 | Stop reason: HOSPADM

## 2022-06-14 RX ORDER — CEFAZOLIN SODIUM 2 G/50ML
2000 SOLUTION INTRAVENOUS EVERY 8 HOURS
Status: COMPLETED | OUTPATIENT
Start: 2022-06-14 | End: 2022-06-15

## 2022-06-14 RX ORDER — OXYCODONE HCL 5 MG/5 ML
10 SOLUTION, ORAL ORAL EVERY 4 HOURS PRN
Status: DISCONTINUED | OUTPATIENT
Start: 2022-06-14 | End: 2022-06-15 | Stop reason: HOSPADM

## 2022-06-14 RX ORDER — LIDOCAINE HYDROCHLORIDE 10 MG/ML
INJECTION, SOLUTION EPIDURAL; INFILTRATION; INTRACAUDAL; PERINEURAL AS NEEDED
Status: DISCONTINUED | OUTPATIENT
Start: 2022-06-14 | End: 2022-06-14

## 2022-06-14 RX ORDER — FENTANYL CITRATE/PF 50 MCG/ML
25 SYRINGE (ML) INJECTION
Status: DISCONTINUED | OUTPATIENT
Start: 2022-06-14 | End: 2022-06-14 | Stop reason: HOSPADM

## 2022-06-14 RX ORDER — ENOXAPARIN SODIUM 100 MG/ML
40 INJECTION SUBCUTANEOUS
Status: DISCONTINUED | OUTPATIENT
Start: 2022-06-15 | End: 2022-06-15 | Stop reason: HOSPADM

## 2022-06-14 RX ORDER — SODIUM CHLORIDE, SODIUM LACTATE, POTASSIUM CHLORIDE, CALCIUM CHLORIDE 600; 310; 30; 20 MG/100ML; MG/100ML; MG/100ML; MG/100ML
75 INJECTION, SOLUTION INTRAVENOUS CONTINUOUS
Status: DISCONTINUED | OUTPATIENT
Start: 2022-06-14 | End: 2022-06-15 | Stop reason: HOSPADM

## 2022-06-14 RX ORDER — HYDRALAZINE HYDROCHLORIDE 20 MG/ML
10 INJECTION INTRAMUSCULAR; INTRAVENOUS ONCE
Status: COMPLETED | OUTPATIENT
Start: 2022-06-14 | End: 2022-06-14

## 2022-06-14 RX ORDER — ROCURONIUM BROMIDE 10 MG/ML
INJECTION, SOLUTION INTRAVENOUS AS NEEDED
Status: DISCONTINUED | OUTPATIENT
Start: 2022-06-14 | End: 2022-06-14

## 2022-06-14 RX ORDER — DIPHENHYDRAMINE HCL 25 MG
25 TABLET ORAL
Status: DISCONTINUED | OUTPATIENT
Start: 2022-06-14 | End: 2022-06-15 | Stop reason: HOSPADM

## 2022-06-14 RX ORDER — ACETAMINOPHEN 160 MG/5ML
975 SUSPENSION, ORAL (FINAL DOSE FORM) ORAL EVERY 8 HOURS
Status: DISCONTINUED | OUTPATIENT
Start: 2022-06-14 | End: 2022-06-15 | Stop reason: HOSPADM

## 2022-06-14 RX ORDER — MIDAZOLAM HYDROCHLORIDE 2 MG/2ML
INJECTION, SOLUTION INTRAMUSCULAR; INTRAVENOUS AS NEEDED
Status: DISCONTINUED | OUTPATIENT
Start: 2022-06-14 | End: 2022-06-14

## 2022-06-14 RX ORDER — NEOSTIGMINE METHYLSULFATE 1 MG/ML
INJECTION INTRAVENOUS AS NEEDED
Status: DISCONTINUED | OUTPATIENT
Start: 2022-06-14 | End: 2022-06-14

## 2022-06-14 RX ORDER — SCOLOPAMINE TRANSDERMAL SYSTEM 1 MG/1
1 PATCH, EXTENDED RELEASE TRANSDERMAL ONCE
Status: DISCONTINUED | OUTPATIENT
Start: 2022-06-14 | End: 2022-06-15 | Stop reason: HOSPADM

## 2022-06-14 RX ORDER — SODIUM CHLORIDE, SODIUM LACTATE, POTASSIUM CHLORIDE, CALCIUM CHLORIDE 600; 310; 30; 20 MG/100ML; MG/100ML; MG/100ML; MG/100ML
125 INJECTION, SOLUTION INTRAVENOUS CONTINUOUS
Status: DISCONTINUED | OUTPATIENT
Start: 2022-06-14 | End: 2022-06-15 | Stop reason: HOSPADM

## 2022-06-14 RX ORDER — HYDROMORPHONE HCL/PF 1 MG/ML
0.5 SYRINGE (ML) INJECTION
Status: DISCONTINUED | OUTPATIENT
Start: 2022-06-14 | End: 2022-06-14 | Stop reason: HOSPADM

## 2022-06-14 RX ORDER — ACETAMINOPHEN 325 MG/1
975 TABLET ORAL ONCE
Status: COMPLETED | OUTPATIENT
Start: 2022-06-14 | End: 2022-06-14

## 2022-06-14 RX ORDER — ALBUTEROL SULFATE 90 UG/1
2 AEROSOL, METERED RESPIRATORY (INHALATION) EVERY 6 HOURS PRN
Status: DISCONTINUED | OUTPATIENT
Start: 2022-06-14 | End: 2022-06-15 | Stop reason: HOSPADM

## 2022-06-14 RX ORDER — DEXAMETHASONE SODIUM PHOSPHATE 10 MG/ML
INJECTION, SOLUTION INTRAMUSCULAR; INTRAVENOUS AS NEEDED
Status: DISCONTINUED | OUTPATIENT
Start: 2022-06-14 | End: 2022-06-14

## 2022-06-14 RX ORDER — FENTANYL CITRATE 50 UG/ML
INJECTION, SOLUTION INTRAMUSCULAR; INTRAVENOUS AS NEEDED
Status: DISCONTINUED | OUTPATIENT
Start: 2022-06-14 | End: 2022-06-14

## 2022-06-14 RX ORDER — ONDANSETRON 2 MG/ML
4 INJECTION INTRAMUSCULAR; INTRAVENOUS EVERY 6 HOURS PRN
Status: DISCONTINUED | OUTPATIENT
Start: 2022-06-14 | End: 2022-06-15 | Stop reason: HOSPADM

## 2022-06-14 RX ORDER — FAMOTIDINE 10 MG/ML
20 INJECTION, SOLUTION INTRAVENOUS EVERY 12 HOURS SCHEDULED
Status: DISCONTINUED | OUTPATIENT
Start: 2022-06-14 | End: 2022-06-15 | Stop reason: HOSPADM

## 2022-06-14 RX ORDER — HYDROMORPHONE HCL 110MG/55ML
PATIENT CONTROLLED ANALGESIA SYRINGE INTRAVENOUS AS NEEDED
Status: DISCONTINUED | OUTPATIENT
Start: 2022-06-14 | End: 2022-06-14

## 2022-06-14 RX ADMIN — ACETAMINOPHEN 975 MG: 325 TABLET, FILM COATED ORAL at 08:02

## 2022-06-14 RX ADMIN — ONDANSETRON 4 MG: 2 INJECTION INTRAMUSCULAR; INTRAVENOUS at 15:21

## 2022-06-14 RX ADMIN — CEFAZOLIN SODIUM 2000 MG: 2 SOLUTION INTRAVENOUS at 18:08

## 2022-06-14 RX ADMIN — SODIUM CHLORIDE, SODIUM LACTATE, POTASSIUM CHLORIDE, AND CALCIUM CHLORIDE: .6; .31; .03; .02 INJECTION, SOLUTION INTRAVENOUS at 10:37

## 2022-06-14 RX ADMIN — FAMOTIDINE 20 MG: 10 INJECTION INTRAVENOUS at 22:51

## 2022-06-14 RX ADMIN — ACETAMINOPHEN 975 MG: 325 SUSPENSION ORAL at 18:07

## 2022-06-14 RX ADMIN — FENTANYL CITRATE 50 MCG: 50 INJECTION INTRAMUSCULAR; INTRAVENOUS at 10:20

## 2022-06-14 RX ADMIN — ONDANSETRON 4 MG: 2 INJECTION INTRAMUSCULAR; INTRAVENOUS at 10:20

## 2022-06-14 RX ADMIN — PROPOFOL 200 MG: 10 INJECTION, EMULSION INTRAVENOUS at 10:20

## 2022-06-14 RX ADMIN — MIDAZOLAM 2 MG: 1 INJECTION INTRAMUSCULAR; INTRAVENOUS at 10:15

## 2022-06-14 RX ADMIN — CELECOXIB 200 MG: 200 CAPSULE ORAL at 08:03

## 2022-06-14 RX ADMIN — CEFAZOLIN SODIUM 2000 MG: 2 SOLUTION INTRAVENOUS at 10:08

## 2022-06-14 RX ADMIN — Medication 10 MG: at 12:56

## 2022-06-14 RX ADMIN — ROCURONIUM BROMIDE 20 MG: 50 INJECTION, SOLUTION INTRAVENOUS at 11:26

## 2022-06-14 RX ADMIN — FENTANYL CITRATE 50 MCG: 50 INJECTION INTRAMUSCULAR; INTRAVENOUS at 11:54

## 2022-06-14 RX ADMIN — ROCURONIUM BROMIDE 50 MG: 50 INJECTION, SOLUTION INTRAVENOUS at 10:20

## 2022-06-14 RX ADMIN — HYDRALAZINE HYDROCHLORIDE 10 MG: 20 INJECTION INTRAMUSCULAR; INTRAVENOUS at 12:33

## 2022-06-14 RX ADMIN — EPHEDRINE SULFATE 5 MG: 50 INJECTION, SOLUTION INTRAVENOUS at 11:23

## 2022-06-14 RX ADMIN — HYDROMORPHONE HYDROCHLORIDE 0.5 MG: 2 INJECTION INTRAMUSCULAR; INTRAVENOUS; SUBCUTANEOUS at 11:39

## 2022-06-14 RX ADMIN — FENTANYL CITRATE 100 MCG: 50 INJECTION INTRAMUSCULAR; INTRAVENOUS at 10:44

## 2022-06-14 RX ADMIN — SODIUM CHLORIDE, SODIUM LACTATE, POTASSIUM CHLORIDE, AND CALCIUM CHLORIDE 125 ML/HR: .6; .31; .03; .02 INJECTION, SOLUTION INTRAVENOUS at 08:25

## 2022-06-14 RX ADMIN — SCOPALAMINE 1 PATCH: 1 PATCH, EXTENDED RELEASE TRANSDERMAL at 08:09

## 2022-06-14 RX ADMIN — GLYCOPYRROLATE 0.4 MG: 0.2 INJECTION, SOLUTION INTRAMUSCULAR; INTRAVENOUS at 11:49

## 2022-06-14 RX ADMIN — DEXAMETHASONE SODIUM PHOSPHATE 10 MG: 10 INJECTION INTRAMUSCULAR; INTRAVENOUS at 10:20

## 2022-06-14 RX ADMIN — GABAPENTIN 300 MG: 300 CAPSULE ORAL at 08:03

## 2022-06-14 RX ADMIN — LIDOCAINE HYDROCHLORIDE 100 MG: 10 INJECTION, SOLUTION EPIDURAL; INFILTRATION; INTRACAUDAL; PERINEURAL at 10:20

## 2022-06-14 RX ADMIN — SODIUM CHLORIDE, SODIUM LACTATE, POTASSIUM CHLORIDE, AND CALCIUM CHLORIDE 75 ML/HR: .6; .31; .03; .02 INJECTION, SOLUTION INTRAVENOUS at 15:21

## 2022-06-14 RX ADMIN — ENOXAPARIN SODIUM 40 MG: 40 INJECTION SUBCUTANEOUS at 08:41

## 2022-06-14 RX ADMIN — NEOSTIGMINE METHYLSULFATE 3 MG: 1 INJECTION INTRAVENOUS at 11:49

## 2022-06-14 NOTE — OP NOTE
OPERATIVE REPORT  PATIENT NAME: Keaton Martin    :  1970  MRN: [id number]  Pt Location: [facility] OR ROOM 08    SURGERY DATE: 2022    Surgeon(s) and Role:     * Mariely Muro MD - Primary     * Nida Angela DO - Assisting    Preop Diagnosis:  Morbid obesity ([facility] [address] ) [E66 01]    Post-Op Diagnosis Codes:     * Morbid obesity ([facility] [address] ) [E66 01]    Procedure(s) (LRB):  LAP SLEEVE GASTRECTOMY W/ ROBOT (N/A)    Specimen(s):  ID Type Source Tests Collected by Time Destination   1 : PORTION OF STOMACH Tissue Stomach TISSUE EXAM Mariely Muro MD 2022 1107        Estimated Blood Loss:   20 ml    Drains:  * No LDAs found *    Anesthesia Type:   General    Operative Indications: Morbid obesity ([facility] [address] ) [E66 01]      Operative Findings:  Normal Findings    Complications:   None    Procedure and Technique:  Robotic Sleeve Gastrectomy   I was present for the entire procedure    Patient Disposition:  hemodynamically stable     No qualified resident was available  Assistant was necessary for instrument exchange and counter traction and assistance with stapling     Indication:   Patient suffers from morbid obesity and associated co-morbidities  and failed to achieve any meaningful or sustainable weight loss and was therefore consented to undergo a laparoscopic sleeve gastrectomy  Risks and benefits were explained to the patient, patient consented for the procedure  Procedure: The patient was brought to the operating room and placed in a supine position  The patient received a dose of IV antibiotics and a dose of subcutaneous Lovenox prior to the procedure  The patient was induced under general endotracheal anesthesia  The abdominal wall was prepped and draped under sterile conditions in the usual fashion   The procedure was started by obtaining access to the abdominal cavity using a Veress needle to the left side of the midline around 6 inches from the xiphoid the abdominal cavity was insufflated with CO2 to a pressure of 15 mmHg  After that, the abdomen was entered with an 8 mm trocar using an Optiview trocar under direct visualization  At that point, an 8 and 12 mm robotic trocars were placed on the right side of the abdominal wall, under direct visualization, and another 8 mm robotic trocar and 12 mm assistant port were placed on the left side of the abdominal wall, also under direct visualization  A TAP block was performed using 20 ml of Exparel mixed with saline and 0 5 % Marcaine for a total of 100 ml  The patient was then placed in a reverse Trendelenburg position  A Mandie retractor was placed through a small stab incision below the xiphoid and was used to retract the left lobe of the liver in a medial fashion  The robot was then brought into the surgical field and the arms docked  An OG tube was placed to decompress the stomach and then removed immediately  The angle of His was then dissected using the vessel sealer  At that point, we turned our attention to the pylorus and using the vessel sealerl, the gastrocolic ligament was taken down starting 4 cm proximal to the pylorus, all the way up to the level of the short gastric vessels which were taken down with the vessel sealer  as well  The angle of His was also dissected and all the posterior attachments of the fundus were taken down with the vessel sealer, the spleen was kept out of harms way and the left dayo was exposed and the stomach was completely mobilized off the left dayo and rotated medially  At that point, the anesthesiologist was asked to insert a 36-Amharic Bougie  The Bougie was secured in place by the anesthesiologist      We started transecting the stomach using a green 60 mm SureForm cartridge and the rest of the firings consisted of blue loads  The 36-Amharic Bougie was kept in place throughout the performance of the sleeve gastrectomy   We made particular attention during the transaction of the greater curvature to retract the stomach laterally at the site of the transected vessels  to prevent corkscrewing of the gastric sleeve  We also avoided getting too close to the incisura to prevent  narrowing at the level of the incisura  The staple line was hemostatic and well formed and there was no evidence of narrowing at the incisura  The staple line was then imbricated using 2-0 V LOC suture in a running fashion while the bougie is in place  At the end, the anesthesiologist was asked to remove the [address]  The surgical field was inspected one more time and appeared hemostatic  We then removed the [facility] liver retractor  The 12 mm port was extended, and then it was dilated  After that, the stomach specimen was retrieved and sent to Pathology  Sponge count was completed and was correct  The 12 mm port was then closed using #1 Vicryl in a simple fashion  All the trocars were removed under direct visualization, and the skin edges were approximated using 4-0 Monocryl in an interrupted, inverted fashion  Histoacryl was then applied to the skin incisions        The patient was extubated and transferred to the PACU in stable condition      SIGNATURE: Wandy Isidro MD  DATE: June 14, 2022  TIME: 11:42 AM

## 2022-06-14 NOTE — ANESTHESIA POSTPROCEDURE EVALUATION
Post-Op Assessment Note    CV Status:  Stable    Pain management: adequate     Mental Status:  Alert and awake   Hydration Status:  Euvolemic   PONV Controlled:  Controlled   Airway Patency:  Patent      Post Op Vitals Reviewed: Yes      Staff: Anesthesiologist         No complications documented    /60   Pulse 82   Temp 97 8 °F (36 6 °C)   Resp 20   Ht 5' 4 5" (1 638 m)   Wt 110 kg (243 lb 6 2 oz)   LMP 04/12/2017 (Exact Date)   SpO2 94%   BMI 41 13 kg/m²   BP      Temp      Pulse     Resp      SpO2

## 2022-06-14 NOTE — PLAN OF CARE
Problem: PAIN - ADULT  Goal: Verbalizes/displays adequate comfort level or baseline comfort level  Description: Interventions:  - Encourage patient to monitor pain and request assistance  - Assess pain using appropriate pain scale  - Administer analgesics based on type and severity of pain and evaluate response  - Implement non-pharmacological measures as appropriate and evaluate response  - Consider cultural and social influences on pain and pain management  - Notify physician/advanced practitioner if interventions unsuccessful or patient reports new pain  Outcome: Progressing     Problem: INFECTION - ADULT  Goal: Absence or prevention of progression during hospitalization  Description: INTERVENTIONS:  - Assess and monitor for signs and symptoms of infection  - Monitor lab/diagnostic results  - Monitor all insertion sites, i e  indwelling lines, tubes, and drains  - Monitor endotracheal if appropriate and nasal secretions for changes in amount and color  - [place] appropriate cooling/warming therapies per order  - Administer medications as ordered  - Instruct and encourage patient and family to use good hand hygiene technique  - Identify and instruct in appropriate isolation precautions for identified infection/condition  Outcome: Progressing     Problem: SAFETY ADULT  Goal: Patient will remain free of falls  Description: INTERVENTIONS:  - Educate patient/family on patient safety including physical limitations  - Instruct patient to call for assistance with activity   - Consult OT/PT to assist with strengthening/mobility   - Keep Call bell within reach  - Keep bed low and locked with side rails adjusted as appropriate  - Keep care items and personal belongings within reach  - Initiate and maintain comfort rounds  - Make Fall Risk Sign visible to staff  - Offer Toileting every 2 Hours, in advance of need  - Apply yellow socks and bracelet for high fall risk patients  - Consider moving patient to room near nurses station  Outcome: Progressing  Goal: Maintain or return to baseline ADL function  Description: INTERVENTIONS:  -  Assess patient's ability to carry out ADLs; assess patient's baseline for ADL function and identify physical deficits which impact ability to perform ADLs (bathing, care of mouth/teeth, toileting, grooming, dressing, etc )  - Assess/evaluate cause of self-care deficits   - Assess range of motion  - Assess patient's mobility; develop plan if impaired  - Assess patient's need for assistive devices and provide as appropriate  - Encourage maximum independence but intervene and supervise when necessary  - Involve family in performance of ADLs  - Assess for home care needs following discharge   - Consider OT consult to assist with ADL evaluation and planning for discharge  - Provide patient education as appropriate  Outcome: Progressing  Goal: Maintains/Returns to pre admission functional level  Description: INTERVENTIONS:  - Perform BMAT or MOVE assessment daily    - Set and communicate daily mobility goal to care team and patient/family/caregiver  - Collaborate with rehabilitation services on mobility goals if consulted  - Perform Range of Motion 3 times a day  - Reposition patient every 2 hours    - Dangle patient 3 times a day  - Stand patient 3 times a day  - Ambulate patient 3 times a day  - Out of bed to chair 3 times a day   - Out of bed for meals 3 times a day  - Out of bed for toileting  - Record patient progress and toleration of activity level   Outcome: Progressing     Problem: DISCHARGE PLANNING  Goal: Discharge to home or other facility with appropriate resources  Description: INTERVENTIONS:  - Identify barriers to discharge w/patient and caregiver  - Arrange for needed discharge resources and transportation as appropriate  - Identify discharge learning needs (meds, wound care, etc )  - Arrange for interpretive services to assist at discharge as needed  - Refer to Case Management Department for coordinating discharge planning if the patient needs post-hospital services based on physician/advanced practitioner order or complex needs related to functional status, cognitive ability, or social support system  Outcome: Progressing     Problem: Knowledge Deficit  Goal: Patient/family/caregiver demonstrates understanding of disease process, treatment plan, medications, and discharge instructions  Description: Complete learning assessment and assess knowledge base    Interventions:  - Provide teaching at level of understanding  - Provide teaching via preferred learning methods  Outcome: Progressing

## 2022-06-14 NOTE — INTERVAL H&P NOTE
H&P reviewed  After examining the patient I find no changes in the patients condition since the H&P had been written      Vitals:    06/14/22 0744   BP: 130/64   Pulse: 82   Resp: 16   Temp: 98 6 °F (37 °C)   SpO2: 96%

## 2022-06-14 NOTE — ANESTHESIA PREPROCEDURE EVALUATION
Procedure:  LAP SLEEVE GASTRECTOMY & INTRAOP EGD W/ ROBOT (N/A Abdomen)    Relevant Problems   PULMONARY   (+) Asthma      Other   (+) Morbidly obese (HCC)        Physical Exam    Airway    Mallampati score: III  TM Distance: >3 FB  Neck ROM: full     Dental       Cardiovascular  Rhythm: regular, Rate: normal, Cardiovascular exam normal    Pulmonary  Pulmonary exam normal Breath sounds clear to auscultation,     Other Findings        Anesthesia Plan  ASA Score- 3     Anesthesia Type- general with ASA Monitors  Additional Monitors:   Airway Plan: ETT  Plan Factors-Exercise tolerance (METS): >4 METS  Chart reviewed  Existing labs reviewed  Patient is not a current smoker  Obstructive sleep apnea risk education given perioperatively  Induction- intravenous  Postoperative Plan-     Informed Consent- Anesthetic plan and risks discussed with patient

## 2022-06-15 VITALS
HEIGHT: 65 IN | RESPIRATION RATE: 18 BRPM | DIASTOLIC BLOOD PRESSURE: 69 MMHG | BODY MASS INDEX: 40.55 KG/M2 | TEMPERATURE: 98.7 F | WEIGHT: 243.39 LBS | HEART RATE: 106 BPM | SYSTOLIC BLOOD PRESSURE: 115 MMHG | OXYGEN SATURATION: 95 %

## 2022-06-15 PROBLEM — Z98.84 BARIATRIC SURGERY STATUS: Status: ACTIVE | Noted: 2022-06-15

## 2022-06-15 LAB
ANION GAP SERPL CALCULATED.3IONS-SCNC: 8 MMOL/L (ref 4–13)
BUN SERPL-MCNC: 7 MG/DL (ref 5–25)
CALCIUM SERPL-MCNC: 8.7 MG/DL (ref 8.3–10.1)
CHLORIDE SERPL-SCNC: 103 MMOL/L (ref 100–108)
CO2 SERPL-SCNC: 26 MMOL/L (ref 21–32)
CREAT SERPL-MCNC: 0.87 MG/DL (ref 0.6–1.3)
ERYTHROCYTE [DISTWIDTH] IN BLOOD BY AUTOMATED COUNT: 12.7 % (ref 11.6–15.1)
GFR SERPL CREATININE-BSD FRML MDRD: 77 ML/MIN/1.73SQ M
GLUCOSE SERPL-MCNC: 120 MG/DL (ref 65–140)
HCT VFR BLD AUTO: 39.7 % (ref 34.8–46.1)
HGB BLD-MCNC: 13.3 G/DL (ref 11.5–15.4)
MCH RBC QN AUTO: 30.4 PG (ref 26.8–34.3)
MCHC RBC AUTO-ENTMCNC: 33.5 G/DL (ref 31.4–37.4)
MCV RBC AUTO: 91 FL (ref 82–98)
PLATELET # BLD AUTO: 363 THOUSANDS/UL (ref 149–390)
PMV BLD AUTO: 9.6 FL (ref 8.9–12.7)
POTASSIUM SERPL-SCNC: 4 MMOL/L (ref 3.5–5.3)
RBC # BLD AUTO: 4.38 MILLION/UL (ref 3.81–5.12)
SODIUM SERPL-SCNC: 137 MMOL/L (ref 136–145)
WBC # BLD AUTO: 11.91 THOUSAND/UL (ref 4.31–10.16)

## 2022-06-15 PROCEDURE — 80048 BASIC METABOLIC PNL TOTAL CA: CPT | Performed by: SURGERY

## 2022-06-15 PROCEDURE — 99024 POSTOP FOLLOW-UP VISIT: CPT | Performed by: SURGERY

## 2022-06-15 PROCEDURE — 85027 COMPLETE CBC AUTOMATED: CPT | Performed by: SURGERY

## 2022-06-15 PROCEDURE — NC001 PR NO CHARGE: Performed by: SURGERY

## 2022-06-15 RX ORDER — ACETAMINOPHEN 160 MG/5ML
975 SUSPENSION, ORAL (FINAL DOSE FORM) ORAL EVERY 8 HOURS
Qty: 638.4 ML | Refills: 0 | Status: SHIPPED | OUTPATIENT
Start: 2022-06-15 | End: 2022-06-22

## 2022-06-15 RX ORDER — KETOROLAC TROMETHAMINE 30 MG/ML
15 INJECTION, SOLUTION INTRAMUSCULAR; INTRAVENOUS ONCE
Status: COMPLETED | OUTPATIENT
Start: 2022-06-15 | End: 2022-06-15

## 2022-06-15 RX ADMIN — ACETAMINOPHEN 325MG 975 MG: 325 TABLET ORAL at 01:27

## 2022-06-15 RX ADMIN — ACETAMINOPHEN 325MG 975 MG: 325 TABLET ORAL at 08:57

## 2022-06-15 RX ADMIN — SODIUM CHLORIDE, SODIUM LACTATE, POTASSIUM CHLORIDE, AND CALCIUM CHLORIDE 75 ML/HR: .6; .31; .03; .02 INJECTION, SOLUTION INTRAVENOUS at 03:41

## 2022-06-15 RX ADMIN — FAMOTIDINE 20 MG: 10 INJECTION INTRAVENOUS at 08:58

## 2022-06-15 RX ADMIN — CEFAZOLIN SODIUM 2000 MG: 2 SOLUTION INTRAVENOUS at 01:27

## 2022-06-15 RX ADMIN — KETOROLAC TROMETHAMINE 15 MG: 30 INJECTION, SOLUTION INTRAMUSCULAR at 08:57

## 2022-06-15 RX ADMIN — ENOXAPARIN SODIUM 40 MG: 40 INJECTION SUBCUTANEOUS at 08:57

## 2022-06-15 NOTE — DISCHARGE INSTR - AVS FIRST PAGE
your medications from [address] in [facility] [address] or cut your pills and open capsules, mix with liquid to drink  Take Tylenol every 8 hours around the clock, unless instructed otherwise  Take your omeprazole daily  It is important to stay hydrated and follow your discharge diet progression   Mild nausea is ok as long as you can drink fluids, sip very slowly and get up and walk during any periods of nausea  You may shower normally after 48 hours, but do not scrub incision sites, blot gently with clean towel to dry incisions  Take home medications as usual unless instructed otherwise while in hospital  Follow up with Dr Devaughn Gottron and your PCP within the next week  Sleeve gastrectomy patients ONLY: Complete full course of lovenox injections!

## 2022-06-15 NOTE — DISCHARGE SUMMARY
Discharge Summary - Micah Martin 49 y o  female MRN: [id number]    Unit/Bed#: E5 -01 Encounter: [id number]      Pre-Operative Diagnosis: Pre-Op Diagnosis Codes:     * Morbid obesity ([address] ) [E66 01]    Post-Operative Diagnosis: Post-Op Diagnosis Codes:     * Morbid obesity ([address] ) [E66 01]    Procedures Performed:  Procedure(s):  LAP SLEEVE GASTRECTOMY W/ ROBOT    Surgeon: Cheri Dominguez MD    See H & P for full details of admission and Operative Note for full details of operations performed  Patient tolerated surgery well without complications  In the morning postoperative Day 1, the patient had mild nausea and abdominal pain  Tolerated a clear liquid diet without vomiting  Able to ambulate and voiding independently  Patient was deemed ready for discharge home  Patient was seen and examined prior to discharge  Provisions for Follow-Up Care:  See After Visit Summary/Discharge Instructions for information related to follow-up care and home orders  Disposition: Home, in stable condition  Planned Readmission: No    Discharge Medications:  See After Visit Summary/Discharge Instructions for reconciled discharge medications provided to patient and family  Post Operative instructions: Reviewed with patient and/or family      Signature:   Dustin Kraus DO  Date: 6/15/2022 Time: 11:10 AM

## 2022-06-15 NOTE — PLAN OF CARE
Problem: PAIN - ADULT  Goal: Verbalizes/displays adequate comfort level or baseline comfort level  Description: Interventions:  - Encourage patient to monitor pain and request assistance  - Assess pain using appropriate pain scale  - Administer analgesics based on type and severity of pain and evaluate response  - Implement non-pharmacological measures as appropriate and evaluate response  - Consider cultural and social influences on pain and pain management  - Notify physician/advanced practitioner if interventions unsuccessful or patient reports new pain  Outcome: Progressing     Problem: INFECTION - ADULT  Goal: Absence or prevention of progression during hospitalization  Description: INTERVENTIONS:  - Assess and monitor for signs and symptoms of infection  - Monitor lab/diagnostic results  - Monitor all insertion sites, i e  indwelling lines, tubes, and drains  - Monitor endotracheal if appropriate and nasal secretions for changes in amount and color  - [place] appropriate cooling/warming therapies per order  - Administer medications as ordered  - Instruct and encourage patient and family to use good hand hygiene technique  - Identify and instruct in appropriate isolation precautions for identified infection/condition  Outcome: Progressing     Problem: SAFETY ADULT  Goal: Patient will remain free of falls  Description: INTERVENTIONS:  - Educate patient/family on patient safety including physical limitations  - Instruct patient to call for assistance with activity   - Consult OT/PT to assist with strengthening/mobility   - Keep Call bell within reach  - Keep bed low and locked with side rails adjusted as appropriate  - Keep care items and personal belongings within reach  - Initiate and maintain comfort rounds  - Make Fall Risk Sign visible to staff  - Offer Toileting every 2 Hours, in advance of need  - Initiate/Maintain bed alarm  - Obtain necessary fall risk management equipment: socks   - Apply yellow socks and bracelet for high fall risk patients  - Consider moving patient to room near nurses station  Outcome: Progressing  Goal: Maintain or return to baseline ADL function  Description: INTERVENTIONS:  -  Assess patient's ability to carry out ADLs; assess patient's baseline for ADL function and identify physical deficits which impact ability to perform ADLs (bathing, care of mouth/teeth, toileting, grooming, dressing, etc )  - Assess/evaluate cause of self-care deficits   - Assess range of motion  - Assess patient's mobility; develop plan if impaired  - Assess patient's need for assistive devices and provide as appropriate  - Encourage maximum independence but intervene and supervise when necessary  - Involve family in performance of ADLs  - Assess for home care needs following discharge   - Consider OT consult to assist with ADL evaluation and planning for discharge  - Provide patient education as appropriate  Outcome: Progressing  Goal: Maintains/Returns to pre admission functional level  Description: INTERVENTIONS:  - Perform BMAT or MOVE assessment daily    - Set and communicate daily mobility goal to care team and patient/family/caregiver  - Collaborate with rehabilitation services on mobility goals if consulted  - Perform Range of Motion 4 times a day  - Reposition patient every 2 hours    - Dangle patient 3 times a day  - Stand patient 3 times a day  - Ambulate patient 3 times a day  - Out of bed to chair 3 times a day   - Out of bed for meals 3 times a day  - Out of bed for toileting  - Record patient progress and toleration of activity level   Outcome: Progressing     Problem: DISCHARGE PLANNING  Goal: Discharge to home or other facility with appropriate resources  Description: INTERVENTIONS:  - Identify barriers to discharge w/patient and caregiver  - Arrange for needed discharge resources and transportation as appropriate  - Identify discharge learning needs (meds, wound care, etc )  - Arrange for interpretive services to assist at discharge as needed  - Refer to Case Management Department for coordinating discharge planning if the patient needs post-hospital services based on physician/advanced practitioner order or complex needs related to functional status, cognitive ability, or social support system  Outcome: Progressing     Problem: Knowledge Deficit  Goal: Patient/family/caregiver demonstrates understanding of disease process, treatment plan, medications, and discharge instructions  Description: Complete learning assessment and assess knowledge base    Interventions:  - Provide teaching at level of understanding  - Provide teaching via preferred learning methods  Outcome: Progressing

## 2022-06-15 NOTE — DISCHARGE INSTRUCTIONS
Bariatric/Weight Loss Surgery  Hospital Discharge Instructions  ACTIVITY:  Progress as feels comfortable - a good rule is:  if you are doing something and it begins to hurt, stop doing the activity  Walk every hour while at home  You may walk stairs if you do so slowly  You may shower 48 hours after surgery  Do not scrub incision sites  Blot gently with clean towel to dry incisions  (see #4 below)   Use your incentive spirometer 10 times per hour while awake for 1 week after surgery  Do NOT drive for 48 hours after surgery  No driving 24 hours after taking certain prescription pain medications  Examples of such medication are Percocet, Darvocet, Oxycodone, Tylenol #3, and Tylenol with Codeine  DIET  Stay on a liquid diet for 7 days after your surgery date, sipping slowly  Refer to your manual for examples of choices  Remember to keep your liquids sugar free or low calorie  You may have protein drinks  Make sure to drink 48 to 64 ounces per day of fluids  You may advance to a pureed diet one week after surgery as instructed by your diet progression pamphlet  Once you get approval from your surgeon at your first post operative visit, you may advance to the soft diet and remain on soft diet for 8 weeks unless otherwise instructed  MEDICATIONS:  The abdominal nerve block will wear off during the first 1-2 days that you are home, and you may become sore (especially over incision site/sites where abdominal wall is sutured)  This may create a pulling sensation, especially while moving around, and will fade over time  Continue to take your Tylenol and your pain medication as instructed  Start vitamins and minerals one week after surgery or when you start stage 3/puree diet  Anti-acid Medication as per prescription  Other medications as indicated on the Physician Patient Discharge Instructions form given to you at the time of discharge    Make sure that you are splitting your pill or tablet medications in halves or fourths or even crushing them before you take them  Capsules should be opened and mixed with water or jello  You need to do this for at least 4 weeks after surgery  Eventually you will be able to take your medications the regular way as they were prescribed  You will need to consult with your Family Doctor in regards to all your prescribed medication, particularly those for blood pressure and diabetes  As you lose weight, medical conditions may change, requiring an alteration or elimination of the drug dose  Monitor blood pressure closely and call PCP with any concerns  Sleeve Gastrectomy patients ONLY:  Complete full course of lovenox injections! DO NOT TAKE BIRTH CONTROL(BC) MEDICATIONS, INSERT BC VAGINAL RINGS, OR PLACE IUD OR ANY OTHER BC METHODS UNTIL 31 DAYS FROM DAY OF DISCHARGE FROM HOSPITAL  THIS PLACES YOU AT HIGH RISK FOR A POTENTIALLY LIFE THREATENING BLOOD CLOT  Remember to always use barrier methods for birth control and speak to your GYN about using two forms of birth control to start 31 days after surgery  It is very important to avoid pregnancy until at least 18-24 months after surgery  INCISION CARE  You may shower and get incisions wet 2 days after surgery  No soaking tub baths or swimming for 30 days after surgery  Keep abdominal area and incisions clean  Use soap and water to create a good lather and rinse off  Do not scrub incisions  If you have a drain, empty the drain as the nurses instructed  FOLLOW-UP APPOINTMENT should be made for one week after discharge  Call surgeons office at [phone] to schedule an appointment      CALL YOUR DOCTOR FOR:  pain not controlled by pain medications, a temperature greater than 101 5° F, any increase or change in drainage or redness from any incision, any vomiting or inability to keep liquids down, shortness of breath, shoulder pain, or bleeding

## 2022-06-15 NOTE — UTILIZATION REVIEW
Initial Clinical Review    Elective IP surgical procedure  Age/Sex: 49 y o  female  Surgery Date: 06/14/22  Procedure: LAP SLEEVE GASTRECTOMY W/ ROBOT (N/A)  Anesthesia: General  Operative Findings: Normal Findings     POD#1 Progress Note: S/p robotic sleeve gastrectomy  Pain adequately controlled  Tolerating PO liquid diet  Exam reveals appropriate abdominal tenderness  Plan: encourage PO fluids, ambulation, incentive spirometry, Lovenox, analgesics, antiemetics, PPI       Admission Orders: Date/Time/Statement:   Admission Orders (From admission, onward)     Ordered        06/14/22 1209  Inpatient Admission  Once                      Orders Placed This Encounter   Procedures    Inpatient Admission     Standing Status:   Standing     Number of Occurrences:   1     Order Specific Question:   Level of Care     Answer:   Med Surg [16]     Order Specific Question:   Bed Type     Answer:   Bariatric [1]     Order Specific Question:   Estimated length of stay     Answer:   Inpatient Only Surgery     Vital Signs: /69   Pulse (!) 106   Temp 98 7 °F (37 1 °C)   Resp 18   Ht 5' 4 5" (1 638 m)   Wt 110 kg (243 lb 6 2 oz)   LMP 04/12/2017 (Exact Date)   SpO2 95%   BMI 41 13 kg/m²     Pertinent Labs/Diagnostic Test Results:   Results from last 7 days   Lab Units 06/15/22  0458   WBC Thousand/uL 11 91*   HEMOGLOBIN g/dL 13 3   HEMATOCRIT % 39 7   PLATELETS Thousands/uL 363     Results from last 7 days   Lab Units 06/15/22  0458   SODIUM mmol/L 137   POTASSIUM mmol/L 4 0   CHLORIDE mmol/L 103   CO2 mmol/L 26   ANION GAP mmol/L 8   BUN mg/dL 7   CREATININE mg/dL 0 87   EGFR ml/min/1 73sq m 77   CALCIUM mg/dL 8 7     Results from last 7 days   Lab Units 06/15/22  0458   GLUCOSE RANDOM mg/dL 120         Diet: Bariatric Clear Liquids  Mobility: Up as tolerated, ambulate q8h  DVT Prophylaxis: SCDs, Lovenox    Medications/Pain Control:   Scheduled Medications:  acetaminophen, 975 mg, Oral, Q8H  enoxaparin, 40 mg, Subcutaneous, Q24H [address]  famotidine, 20 mg, Intravenous, Q12H [address]  scopolamine, 1 patch, Transdermal, Once    Continuous IV Infusions:  lactated ringers, 75 mL/hr, Intravenous, Continuous    PRN Meds:  albuterol, 2 puff, Inhalation, Q6H PRN  diphenhydrAMINE, 25 mg, Oral, HS PRN  hydralazine, 10 mg, Intravenous; 6/14 x1  ketorolac, 15 mg, Intravenous; 6/15 x1  labetalol, 10 mg, Intravenous; 6/14 x1  metoclopramide, 10 mg, Intravenous, Q6H PRN  morphine injection, 2 mg, Intravenous, Q4H PRN  ondansetron, 4 mg, Intravenous, Q6H PRN; 6/14 x1  oxyCODONE, 10 mg, Oral, Q4H PRN  oxyCODONE, 5 mg, Oral, Q4H PRN  phenol, 1 spray, Mouth/Throat, Q2H PRN  promethazine, 25 mg, Intramuscular, Q6H PRN  simethicone, 80 mg, Oral, 4x Daily PRN        Network Utilization Review Department  ATTENTION: Please call with any questions or concerns to [phone] and carefully listen to the prompts so that you are directed to the right person  All voicemails are confidential   Marily Holt all requests for admission clinical reviews, approved or denied determinations and any other requests to dedicated fax number below belonging to the campus where the patient is receiving treatment   List of dedicated fax numbers for the Facilities:  [address] DENIALS (Administrative/Medical Necessity) [phone]   [address] (Maternity/NICU/Pediatrics) [phone]   [address] [address] [facility] [phone]   [place] [address] [phone]

## 2022-06-15 NOTE — PLAN OF CARE
Problem: PAIN - ADULT  Goal: Verbalizes/displays adequate comfort level or baseline comfort level  Description: Interventions:  - Encourage patient to monitor pain and request assistance  - Assess pain using appropriate pain scale  - Administer analgesics based on type and severity of pain and evaluate response  - Implement non-pharmacological measures as appropriate and evaluate response  - Consider cultural and social influences on pain and pain management  - Notify physician/advanced practitioner if interventions unsuccessful or patient reports new pain  6/15/2022 1034 by Teri Salter RN  Outcome: Progressing  6/15/2022 1034 by Teri Salter RN  Outcome: Progressing     Problem: INFECTION - ADULT  Goal: Absence or prevention of progression during hospitalization  Description: INTERVENTIONS:  - Assess and monitor for signs and symptoms of infection  - Monitor lab/diagnostic results  - Monitor all insertion sites, i e  indwelling lines, tubes, and drains  - Monitor endotracheal if appropriate and nasal secretions for changes in amount and color  - Beecher appropriate cooling/warming therapies per order  - Administer medications as ordered  - Instruct and encourage patient and family to use good hand hygiene technique  - Identify and instruct in appropriate isolation precautions for identified infection/condition  6/15/2022 1034 by Teri Salter RN  Outcome: Progressing  6/15/2022 1034 by Teri Salter RN  Outcome: Progressing     Problem: SAFETY ADULT  Goal: Patient will remain free of falls  Description: INTERVENTIONS:  - Educate patient/family on patient safety including physical limitations  - Instruct patient to call for assistance with activity   - Consult OT/PT to assist with strengthening/mobility   - Keep Call bell within reach  - Keep bed low and locked with side rails adjusted as appropriate  - Keep care items and personal belongings within reach  - Initiate and maintain comfort rounds  - Make Fall Risk Sign visible to staff  - Offer Toileting every 2 Hours, in advance of need  - Initiate/Maintain n/a alarm  - Obtain necessary fall risk management equipment: call bell  - Apply yellow socks and bracelet for high fall risk patients  - Consider moving patient to room near nurses station  6/15/2022 1034 by Debarah Lennox, RN  Outcome: Progressing  6/15/2022 1034 by Debarah Lennox, RN  Outcome: Progressing  Goal: Maintain or return to baseline ADL function  Description: INTERVENTIONS:  -  Assess patient's ability to carry out ADLs; assess patient's baseline for ADL function and identify physical deficits which impact ability to perform ADLs (bathing, care of mouth/teeth, toileting, grooming, dressing, etc )  - Assess/evaluate cause of self-care deficits   - Assess range of motion  - Assess patient's mobility; develop plan if impaired  - Assess patient's need for assistive devices and provide as appropriate  - Encourage maximum independence but intervene and supervise when necessary  - Involve family in performance of ADLs  - Assess for home care needs following discharge   - Consider OT consult to assist with ADL evaluation and planning for discharge  - Provide patient education as appropriate  6/15/2022 1034 by Debarah Lennox, RN  Outcome: Progressing  6/15/2022 1034 by Debarah Lennox, RN  Outcome: Progressing  Goal: Maintains/Returns to pre admission functional level  Description: INTERVENTIONS:  - Perform BMAT or MOVE assessment daily    - Set and communicate daily mobility goal to care team and patient/family/caregiver  - Collaborate with rehabilitation services on mobility goals if consulted  - Perform Range of Motion 3 times a day  - Reposition patient every 2 hours    - Dangle patient 3 times a day  - Stand patient 3 times a day  - Ambulate patient 3 times a day  - Out of bed to chair 3 times a day   - Out of bed for meals 3 times a day  - Out of bed for toileting  - Record patient progress and toleration of activity level   6/15/2022 1034 by Leni Sanders RN  Outcome: Progressing  6/15/2022 1034 by Leni Sanders RN  Outcome: Progressing     Problem: DISCHARGE PLANNING  Goal: Discharge to home or other facility with appropriate resources  Description: INTERVENTIONS:  - Identify barriers to discharge w/patient and caregiver  - Arrange for needed discharge resources and transportation as appropriate  - Identify discharge learning needs (meds, wound care, etc )  - Arrange for interpretive services to assist at discharge as needed  - Refer to Case Management Department for coordinating discharge planning if the patient needs post-hospital services based on physician/advanced practitioner order or complex needs related to functional status, cognitive ability, or social support system  6/15/2022 1034 by Leni Sanders RN  Outcome: Progressing  6/15/2022 1034 by Leni Sanders RN  Outcome: Progressing     Problem: Knowledge Deficit  Goal: Patient/family/caregiver demonstrates understanding of disease process, treatment plan, medications, and discharge instructions  Description: Complete learning assessment and assess knowledge base    Interventions:  - Provide teaching at level of understanding  - Provide teaching via preferred learning methods  6/15/2022 1034 by Leni Sanders RN  Outcome: Progressing  6/15/2022 1034 by Leni Sanders RN  Outcome: Progressing     Problem: SKIN/TISSUE INTEGRITY - ADULT  Goal: Incision(s), wounds(s) or drain site(s) healing without S/S of infection  Description: INTERVENTIONS  - Assess and document dressing, incision, wound bed, drain sites and surrounding tissue  - Provide patient and family education  - Perform skin care/dressing changes every shift  6/15/2022 1034 by Leni Sanders RN  Outcome: Progressing  6/15/2022 1034 by Leni Sanders RN  Outcome: Progressing

## 2022-06-15 NOTE — PROGRESS NOTES
Progress Note - Bariatric Surgery   Cecy Martin 49 y o  female MRN: [id number]  Unit/Bed#: E5 -01 Encounter: [id number]      Subjective/Objective     Subjective:  Patient with morbid obesity s/p Robot assisted Sleeve Gastrectomy, POD1    Patient has been tolerating a liquid diet without nausea or vomiting today  Ambulating without assistance, voiding well, and using incentive spirometer  Pain is adequately controlled on oral pain medication  Patient denies fevers, chills, sweats, SOB, CP, calf pain  Objective:    /69   Pulse (!) 106   Temp 98 7 °F (37 1 °C)   Resp 18   Ht 5' 4 5" (1 638 m)   Wt 110 kg (243 lb 6 2 oz)   LMP 04/12/2017 (Exact Date)   SpO2 95%   BMI 41 13 kg/m²       Intake/Output Summary (Last 24 hours) at 6/15/2022 0823  Last data filed at 6/15/2022 0741  Gross per 24 hour   Intake 1825 ml   Output 1500 ml   Net 325 ml       Invasive Devices  Report    Peripheral Intravenous Line  Duration           Peripheral IV 06/14/22 Left;Ventral (anterior) Forearm <1 day                ROS: 10-point system completed  All negative except see HPI  Physical Exam    General Appearance:    Alert, cooperative, no distress, appears stated age   Head:    Normocephalic, without obvious abnormality, atraumatic   Lungs:     Respirations unlabored   Heart:    Regular rate and rhythm   Abdomen:     Soft, appropriate incisional tenderness, no masses, no organomegaly, non-distended   Extremities:   Extremities normal, atraumatic, no cyanosis or edema   Neurologic:  Incision:    Psych:   Normal strength and sensation    Abdominal incisions are clean, dry, and intact, without   redness, bleeding, or drainage    Normal mood and affect       Lab, Imaging and other studies:  I have personally reviewed pertinent lab results    , CBC:   Lab Results   Component Value Date    WBC 11 91 (H) 06/15/2022    HGB 13 3 06/15/2022    HCT 39 7 06/15/2022    MCV 91 06/15/2022     06/15/2022    MCH 30 4 06/15/2022    MCHC 33 5 06/15/2022    RDW 12 7 06/15/2022    MPV 9 6 06/15/2022   , CMP:   Lab Results   Component Value Date    SODIUM 137 06/15/2022    K 4 0 06/15/2022     06/15/2022    CO2 26 06/15/2022    BUN 7 06/15/2022    CREATININE 0 87 06/15/2022    CALCIUM 8 7 06/15/2022    EGFR 77 06/15/2022        VTE Mechanical Prophylaxis: sequential compression device  VTE Mechanical Prophylaxis: PreOp Lovenox given    Assessment/Plan  1)  Patient with morbid obesity s/p Robot Assisted Sleeve Gastrectomy with stable post op course  Patient afebrile and hemodynamically stable  - Encourage PO fluids  - Recommend ambulation, use of SCDs when not ambulating,   - Incentive spirometry encouraged  - Okay to2 give Lovenox   - Patient to remain on PPI upon discharge  - Plan to D/C patient home today pending anticipated progression    Patient seen and examinedon rounds  Plan of care was discussed with patient

## 2022-06-16 ENCOUNTER — TELEPHONE (OUTPATIENT)
Dept: BARIATRICS | Facility: CLINIC | Age: 52
End: 2022-06-16

## 2022-06-16 NOTE — TELEPHONE ENCOUNTER
Post op follow up phone call completed   Pt is sipping liquids, using IS as instructed, reinforced importance of using IS to help prevent pneumonia   Ambulating about home without difficulty   Pain controlled with analgesia   Reaffirmed examples of liquid diet over the next week   Pt stated understanding about discharge instructions and medication adjustments   Tolerating self administration of Lovenox injections without difficulty    Follow up appt with surgeon scheduled for next week    Instructed to call with any additional questions or concerns

## 2022-06-23 ENCOUNTER — OFFICE VISIT (OUTPATIENT)
Dept: BARIATRICS | Facility: CLINIC | Age: 52
End: 2022-06-23

## 2022-06-23 VITALS
WEIGHT: 231 LBS | HEART RATE: 75 BPM | HEIGHT: 65 IN | TEMPERATURE: 97.1 F | SYSTOLIC BLOOD PRESSURE: 118 MMHG | BODY MASS INDEX: 38.49 KG/M2 | DIASTOLIC BLOOD PRESSURE: 74 MMHG

## 2022-06-23 DIAGNOSIS — Z98.84 BARIATRIC SURGERY STATUS: Primary | ICD-10-CM

## 2022-06-23 DIAGNOSIS — E66.01 MORBIDLY OBESE (HCC): ICD-10-CM

## 2022-06-23 DIAGNOSIS — E66.9 OBESITY, CLASS II, BMI 35-39.9: ICD-10-CM

## 2022-06-23 PROCEDURE — RECHECK: Performed by: DIETITIAN, REGISTERED

## 2022-06-23 PROCEDURE — 99024 POSTOP FOLLOW-UP VISIT: CPT | Performed by: SURGERY

## 2022-06-23 NOTE — PROGRESS NOTES
POST OP UP VISIT - BARIATRIC SURGERY  Nazario Martin 49 y o  female MRN: [id number]  Unit/Bed#:  Encounter: [id number]      HPI:  Nazario Martin is a 49 y o  female status post Robotic sleeve gastrectomy performed by Dr Teresa Gonzalez on 22 returning to office for first post op visit since surgery  Tolerating puree diet  Denies nausea and vomiting  Taking multivitamins and PPI daily  Administering lovenox injections  Review of Systems   Constitutional: Negative for chills and fever  HENT: Negative for ear pain and sore throat  Eyes: Negative for pain and visual disturbance  Respiratory: Negative for cough and shortness of breath  Cardiovascular: Negative for chest pain and palpitations  Gastrointestinal: Negative for abdominal pain and vomiting  Genitourinary: Negative for dysuria and hematuria  Musculoskeletal: Negative for arthralgias and back pain  Skin: Negative for color change and rash  Neurological: Negative for seizures and syncope  All other systems reviewed and are negative        Historical Information   Past Medical History:   Diagnosis Date    Asthma     Cancer ([facility] [address] )     melanoma    Migraines     Obese     gastric sleeve today 2022    Seasonal allergies     Stress incontinence 2021     Past Surgical History:   Procedure Laterality Date     SECTION      MT LAP, CARMEOL RESTRICT PROC, LONGITUDINAL GASTRECTOMY N/A 2022    Procedure: LAP SLEEVE GASTRECTOMY W/ ROBOT;  Surgeon: Mechelle Glover MD;  Location: [facility];  Service: Bariatrics    SKIN CANCER EXCISION Left     melanoma thigh     Social History   Social History     Substance and Sexual Activity   Alcohol Use Yes    Alcohol/week: 2 0 standard drinks    Types: 2 Cans of beer per week    Comment: 4 x yearly     Social History     Substance and Sexual Activity   Drug Use Never     Social History     Tobacco Use   Smoking Status Never Smoker   Smokeless Tobacco Never Used     Family History:   Family History   Problem Relation Age of Onset    Lung cancer Mother     Heart disease Mother     Diabetes Father     Cancer Maternal Uncle         bladder    Stroke Maternal Uncle     Diabetes Maternal Uncle     Heart disease Maternal Uncle     Diabetes Paternal Uncle     Heart disease Maternal Grandmother     Heart disease Maternal Grandfather     Hypertension Neg Hx     Thyroid disease Neg Hx        Meds/Allergies   all medications and allergies reviewed  No Known Allergies    Objective       Current Vitals:   Blood Pressure: 118/74 (06/23/22 0857)  Pulse: 75 (06/23/22 0857)  Temperature: (!) 97 1 °F (36 2 °C) (06/23/22 0857)  Temp Source: Tympanic (06/23/22 0857)  Height: 5' 4 5" (163 8 cm) (06/23/22 0857)  Weight - Scale: 105 kg (231 lb) (06/23/22 0857)      Invasive Devices  Report    None                 Physical Exam  Constitutional:       Appearance: Normal appearance  She is obese  HENT:      Head: Normocephalic and atraumatic  Nose: Nose normal       Mouth/Throat:      Mouth: Mucous membranes are moist    Eyes:      Extraocular Movements: Extraocular movements intact  Pupils: Pupils are equal, round, and reactive to light  Cardiovascular:      Rate and Rhythm: Normal rate and regular rhythm  Pulses: Normal pulses  Heart sounds: Normal heart sounds  Pulmonary:      Effort: Pulmonary effort is normal       Breath sounds: Normal breath sounds  Abdominal:      Palpations: Abdomen is soft  Comments: Incisions healing well with no signs of infection or drainage   Musculoskeletal:      Cervical back: Normal range of motion  Skin:     General: Skin is warm  Neurological:      General: No focal deficit present  Mental Status: She is alert and oriented to person, place, and time     Psychiatric:         Mood and Affect: Mood normal          Behavior: Behavior normal              Assessment/PLAN:    49 y o  female status post Robotic sleeve gastrectomy done on 6/14/22 by Dr Leslie Gray, doing well post op  No major issues and healing well  The pathology report was reviewed with the patient and the results were within normal limits  Pathology, and Other Studies: I have personally reviewed pertinent reports  Specimens    A Stomach, PORTION OF STOMACH  Final Diagnosis  A  Stomach, sleeve gastrectomy:  - Segment of full-thickness stomach with no pathologic abnormality   - Negative for intestinal metaplasia, dysplasia or carcinoma  - No Helicobacter pylori is identified on H&E stained slide  Increase physical activity slowly as tolerated and instructed  Advance diet as instructed by our dietitians today and as indicated in the binder  Continue Lovenox prophylaxis until completed  Continue PPI    Follow up in one month as scheduled          Kashmir Ley PA-C  Bariatric Surgery   6/23/2022  9:03 AM

## 2022-06-23 NOTE — PROGRESS NOTES
Weight Management Nutrition Class     Diagnosis: Obesity    Bariatric Surgeon: Dr Jm Fields    Surgery: Vertical Sleeve Gastrectomy    Class: first post op note    Topics discussed today include:     fluid goals post op, protein goals post op, constipation, chew food well, exercise, avoidance of alcohol, PPI use, diet progression, hypoglycemia, dumping syndrome, protein supplems, vitamin/mineral supplements, calcium supplements and iron supplements    Patient was able to verbalize basic diet (protein, fluid, vitamin and mineral) recommendations and possible nutrition-related complications   Yes

## 2022-06-23 NOTE — UTILIZATION REVIEW
Notification of Discharge   This is a Notification of Discharge from our facility [address]  Please be advised that this patient has been discharge from our facility  Below you will find the admission and discharge date and time including the patients disposition  UTILIZATION REVIEW CONTACT:  Ayla Reyes MA  Utilization   Network Utilization Review Department  Phone: [phone] x carefully listen to the prompts  All voicemails are confidential   Email: [email or web address]     PHYSICIAN ADVISORY SERVICES:  FOR [id number] REVIEW - MEDICAL NECESSITY DENIAL  Phone: [phone]  Fax: [phone]  Email: [email or web address]     PRESENTATION DATE: 6/14/2022  7:14 AM  OBERVATION ADMISSION DATE:   INPATIENT ADMISSION DATE: 6/14/22 12:09 PM   DISCHARGE DATE: 6/15/2022 12:23 PM  DISPOSITION: Home/Self Care Home/Self Care      IMPORTANT INFORMATION:  Send all requests for admission clinical reviews, approved or denied determinations and any other requests to dedicated fax number below belonging to the campus where the patient is receiving treatment   List of dedicated fax numbers:  [address] DENIALS (Administrative/Medical Necessity) [phone]   [address] [place] [address] (Maternity/NICU/Pediatrics) [phone]   [facility] [phone]   130 [facility] [phone]   [address] [place] [address] [phone]   2000 [facility] [address] [place] [address],[address] [phone]   [facility]  [phone]   220[address] [facility]  [address] [facility] 1000 [address] [facility] [phone]

## 2022-08-05 ENCOUNTER — TELEPHONE (OUTPATIENT)
Dept: BARIATRICS | Facility: CLINIC | Age: 52
End: 2022-08-05

## 2022-08-05 NOTE — TELEPHONE ENCOUNTER
LVM to reschedule a missed global class  Left office info to call back [phone]   Also let the patient know that this appt needs to be done in the month of August

## 2022-09-23 ENCOUNTER — OFFICE VISIT (OUTPATIENT)
Dept: BARIATRICS | Facility: CLINIC | Age: 52
End: 2022-09-23
Payer: COMMERCIAL

## 2022-09-23 VITALS
SYSTOLIC BLOOD PRESSURE: 110 MMHG | BODY MASS INDEX: 33.66 KG/M2 | WEIGHT: 202 LBS | HEIGHT: 65 IN | TEMPERATURE: 98 F | HEART RATE: 71 BPM | DIASTOLIC BLOOD PRESSURE: 78 MMHG

## 2022-09-23 DIAGNOSIS — Z98.84 BARIATRIC SURGERY STATUS: ICD-10-CM

## 2022-09-23 DIAGNOSIS — Z48.815 ENCOUNTER FOR SURGICAL AFTERCARE FOLLOWING SURGERY OF DIGESTIVE SYSTEM: Primary | ICD-10-CM

## 2022-09-23 DIAGNOSIS — E66.9 OBESITY, CLASS I, BMI 30-34.9: ICD-10-CM

## 2022-09-23 DIAGNOSIS — Z12.11 COLON CANCER SCREENING: ICD-10-CM

## 2022-09-23 DIAGNOSIS — K91.2 POSTSURGICAL MALABSORPTION: ICD-10-CM

## 2022-09-23 DIAGNOSIS — E66.01 OBESITY, CLASS III, BMI 40-49.9 (MORBID OBESITY) (HCC): ICD-10-CM

## 2022-09-23 PROCEDURE — 99213 OFFICE O/P EST LOW 20 MIN: CPT | Performed by: NURSE PRACTITIONER

## 2022-09-23 RX ORDER — OMEPRAZOLE 20 MG/1
20 CAPSULE, DELAYED RELEASE ORAL DAILY
Qty: 30 CAPSULE | Refills: 3 | Status: SHIPPED | OUTPATIENT
Start: 2022-09-23

## 2022-09-23 NOTE — PROGRESS NOTES
Assessment/Plan:     Patient ID: Carlos Carson is a 47 y o  female  Bariatric Surgery Status    -s/p Vertical Sleeve Gastrectomy with Dr Neena Ramos on 06/14/2022  Presents to the office today for 2nd post op visit  Overall doing well, tolerating a regular diet  Denies having any abdominal pain, N/V/D/C, regurgitation, reflux or dysphagia  Taking her MVI and PPI daily  PLAN:     - continue with omeprazole for now  Will plan to taper off at next visit  - Routine follow up in 3 months for 3rd post op visit  - Continue with healthy lifestyle, adequate protein intake of 60 gm, fluid intake of at least 64 oz  - Continue with MVI daily  - Activity as tolerated  - Labs ordered and will adjust accordingly if any deficiency  - Follow up with RD and SW as needed       · Continued/Maintain healthy weight loss with good nutrition intakes  · Adequate hydration with at least 64oz  fluid intake  · Follow diet as discussed  · Follow vitamin and mineral recommendations as reviewed with you  · Exercise as tolerated  · Colonoscopy referral made: referral to GI ordered  · Mammogram - has one scheduled  · Follow-up in 3 months for 3rd post op visit  We kindly ask that your arrive 15 minutes before your scheduled appointment time with your provider to allow our staff to room you, get your vital signs and update your chart  · Call our office if you have any problems with abdominal pain especially associated with fever, chills, nausea, vomiting or any other concerns  · All  Post-bariatric surgery patients should be aware that very small quantities of any alcohol can cause impairment and it is very possible not to feel the effect  The effect can be in the system for several hours  It is also a stomach irritant  · It is advised to AVOID alcohol, Nonsteroidal antiinflammatory drugs (NSAIDS) and nicotine of all forms   Any of these can cause stomach irritation/pain      · Discussed the effects of alcohol on a bariatric patient and the increased impairment risk  · Keep up the good work! Postsurgical Malabsorption   -At risk for malabsorption of vitamins/minerals secondary to malabsorption and restriction of intake from bariatric surgery  - Currently taking adequate postop bariatric surgery vitamin supplementation  -Next set of bariatric labs ordered for approximately 2 weeks  -Patient received education about the importance of adhering to a lifelong supplementation regimen to avoid vitamin/mineral deficiencies      Diagnoses and all orders for this visit:    Encounter for surgical aftercare following surgery of digestive system  -     Zinc; Future  -     Vitamin D 25 hydroxy; Future  -     Vitamin B12; Future  -     Vitamin B1, whole blood; Future  -     Vitamin A; Future  -     PTH, intact; Future  -     CBC and Platelet; Future  -     Comprehensive metabolic panel; Future  -     Ferritin; Future  -     Folate; Future  -     Iron Saturation %; Future    Bariatric surgery status  -     Zinc; Future  -     Vitamin D 25 hydroxy; Future  -     Vitamin B12; Future  -     Vitamin B1, whole blood; Future  -     Vitamin A; Future  -     PTH, intact; Future  -     CBC and Platelet; Future  -     Comprehensive metabolic panel; Future  -     Ferritin; Future  -     Folate; Future  -     Iron Saturation %; Future  -     omeprazole (PriLOSEC) 20 mg delayed release capsule; Take 1 capsule (20 mg total) by mouth daily  -     Ambulatory referral to Gastroenterology; Future    Postsurgical malabsorption  -     Zinc; Future  -     Vitamin D 25 hydroxy; Future  -     Vitamin B12; Future  -     Vitamin B1, whole blood; Future  -     Vitamin A; Future  -     PTH, intact; Future  -     CBC and Platelet; Future  -     Comprehensive metabolic panel; Future  -     Ferritin; Future  -     Folate; Future  -     Iron Saturation %; Future    Obesity, Class I, BMI 30-34 9  -     Zinc; Future  -     Vitamin D 25 hydroxy;  Future  - Vitamin B12; Future  -     Vitamin B1, whole blood; Future  -     Vitamin A; Future  -     PTH, intact; Future  -     CBC and Platelet; Future  -     Comprehensive metabolic panel; Future  -     Ferritin; Future  -     Folate; Future  -     Iron Saturation %; Future    Obesity, Class III, BMI 40-49 9 (morbid obesity) ([facility] [address] )    Colon cancer screening  -     Ambulatory referral to Gastroenterology; Future         Subjective:      Patient ID: Josselyn Guido is a 47 y o  female  -s/p Vertical Sleeve Gastrectomy with Dr Nabil Barrera on 06/14/2022  Presents to the office today for 2nd post op visit  Overall doing well, tolerating a regular diet  Denies having any abdominal pain, N/V/D/C, regurgitation, reflux or dysphagia  Taking her MVI and PPI daily  Initial: 244 lbs  Current: 202 lbs  EWL: (Weight loss is ahead of schedule at this post surgical period )  Jelani: 199 lbs   Current BMI is Body mass index is 34 14 kg/m²  · Tolerating a regular diet-yes  · Eating at least 60 grams of protein per day-yes  · Following 30/60 minute rule with liquids-yes  · Drinking at least 64 ounces of fluid per day-yes  · Drinking carbonated beverages-no  · Sufficient exercise-yes  · Using NSAIDs regularly-no  · Using nicotine-no  · Using alcohol-no  · Supplements: Multivitamins, Calcium  and vitamin D3 - 1,000 IU     · EWL is 41%, which places the patient ahead of schedule for expected post surgical weight loss at this time  The following portions of the patient's history were reviewed and updated as appropriate: allergies, current medications, past family history, past medical history, past social history, past surgical history and problem list     Review of Systems   Constitutional: Negative  Respiratory: Negative  Cardiovascular: Negative  Gastrointestinal: Negative  Musculoskeletal: Negative  Neurological: Negative  Psychiatric/Behavioral: Negative            Objective:    /78   Pulse 71   Temp 98 °F (36 7 °C) (Tympanic)   Ht 5' 4 5" (1 638 m)   Wt 91 6 kg (202 lb)   LMP 04/12/2017 (Exact Date)   BMI 34 14 kg/m²      Physical Exam  Vitals and nursing note reviewed  Constitutional:       Appearance: Normal appearance  She is obese  Cardiovascular:      Rate and Rhythm: Normal rate and regular rhythm  Pulses: Normal pulses  Heart sounds: Normal heart sounds  Pulmonary:      Effort: Pulmonary effort is normal       Breath sounds: Normal breath sounds  Abdominal:      General: Bowel sounds are normal       Palpations: Abdomen is soft  Tenderness: There is no abdominal tenderness  Comments: All incisions healed well; no signs of infection  Musculoskeletal:         General: Normal range of motion  Skin:     General: Skin is warm and dry  Neurological:      General: No focal deficit present  Mental Status: She is alert and oriented to person, place, and time  Psychiatric:         Mood and Affect: Mood normal          Behavior: Behavior normal          Thought Content:  Thought content normal          Judgment: Judgment normal

## 2023-10-06 DIAGNOSIS — Z98.84 BARIATRIC SURGERY STATUS: ICD-10-CM

## 2023-10-09 RX ORDER — OMEPRAZOLE 20 MG/1
20 CAPSULE, DELAYED RELEASE ORAL DAILY
Qty: 30 CAPSULE | Refills: 0 | OUTPATIENT
Start: 2023-10-09

## 2023-11-06 ENCOUNTER — TELEPHONE (OUTPATIENT)
Dept: BARIATRICS | Facility: CLINIC | Age: 53
End: 2023-11-06

## 2023-11-06 NOTE — TELEPHONE ENCOUNTER
Spoke with patient about scheduling a 1 yr folow up appt. Patient will call back when they know there work schedule.  Gave office info [phone] to call back

## 2024-01-27 ENCOUNTER — OFFICE VISIT (OUTPATIENT)
Dept: URGENT CARE | Age: 54
End: 2024-01-27
Payer: COMMERCIAL

## 2024-01-27 VITALS
RESPIRATION RATE: 20 BRPM | OXYGEN SATURATION: 99 % | BODY MASS INDEX: 31.99 KG/M2 | WEIGHT: 192 LBS | SYSTOLIC BLOOD PRESSURE: 125 MMHG | DIASTOLIC BLOOD PRESSURE: 72 MMHG | TEMPERATURE: 98.6 F | HEART RATE: 74 BPM | HEIGHT: 65 IN

## 2024-01-27 DIAGNOSIS — H10.31 ACUTE BACTERIAL CONJUNCTIVITIS OF RIGHT EYE: Primary | ICD-10-CM

## 2024-01-27 PROCEDURE — 99213 OFFICE O/P EST LOW 20 MIN: CPT | Performed by: PHYSICIAN ASSISTANT

## 2024-01-27 RX ORDER — GENTAMICIN SULFATE 3 MG/ML
2 SOLUTION/ DROPS OPHTHALMIC 4 TIMES DAILY
Qty: 5 ML | Refills: 0 | Status: SHIPPED | OUTPATIENT
Start: 2024-01-27 | End: 2024-02-06

## 2024-01-27 NOTE — PROGRESS NOTES
"[facility]        NAME: Gale Martin is a 53 y.o. female  : 1970    MRN: [id number]  DATE: 2024  TIME: 12:21 PM    /72   Pulse 74   Temp 98.6 °F (37 °C)   Resp 20   Ht 5' 5\" (1.651 m)   Wt 87.1 kg (192 lb)   LMP 2017 (Exact Date)   SpO2 99%   BMI 31.95 kg/m²     Assessment and Plan   Acute bacterial conjunctivitis of right eye [H10.31]  1. Acute bacterial conjunctivitis of right eye  gentamicin (GARAMYCIN) 0.3 % ophthalmic solution            Patient Instructions       Follow up with PCP in 3-5 days.  Proceed to  ER if symptoms worsen.    Chief Complaint     Chief Complaint   Patient presents with    Eye Pain     Right eye redness/discharge/pain began today         History of Present Illness       Pt with 1-2 days right eye redness and d/c  ,  reports vision is her normal    Eye Pain   Associated symptoms include an eye discharge and eye redness.       Review of Systems   Review of Systems   Constitutional: Negative.    HENT: Negative.     Eyes:  Positive for discharge and redness.   Respiratory: Negative.     Cardiovascular: Negative.    Gastrointestinal: Negative.    Endocrine: Negative.    Genitourinary: Negative.    Musculoskeletal: Negative.    Allergic/Immunologic: Negative.    Neurological: Negative.    Hematological: Negative.    Psychiatric/Behavioral: Negative.     All other systems reviewed and are negative.        Current Medications       Current Outpatient Medications:     albuterol (PROVENTIL HFA,VENTOLIN HFA) 90 mcg/act inhaler, 2 puffs every 6 (six) hours as needed, Disp: , Rfl:     cholecalciferol (VITAMIN D3) 1,000 units tablet, Take 1,000 Units by mouth daily, Disp: , Rfl:     gentamicin (GARAMYCIN) 0.3 % ophthalmic solution, Administer 2 drops to both eyes 4 (four) times a day for 10 days, Disp: 5 mL, Rfl: 0    omeprazole (PriLOSEC) 20 mg delayed release capsule, Take 1 capsule (20 mg total) by mouth daily, Disp: 30 capsule, Rfl: 3    Current " "Allergies     Allergies as of 2024    (No Known Allergies)            The following portions of the patient's history were reviewed and updated as appropriate: allergies, current medications, past family history, past medical history, past social history, past surgical history and problem list.     Past Medical History:   Diagnosis Date    Asthma     Cancer (HCC)     melanoma    Migraines     Obese     gastric sleeve today 2022    Seasonal allergies     Stress incontinence 2021       Past Surgical History:   Procedure Laterality Date     SECTION      MN LAPS GSTRC RSTRICTIV PX LONGITUDINAL GASTRECTOMY N/A 2022    Procedure: LAP SLEEVE GASTRECTOMY W/ ROBOT;  Surgeon: Bryant Sidhu MD;  Location: [facility];  Service: Bariatrics    SKIN CANCER EXCISION Left     melanoma thigh       Family History   Problem Relation Age of Onset    Lung cancer Mother     Heart disease Mother     Diabetes Father     Cancer Maternal Uncle         bladder    Stroke Maternal Uncle     Diabetes Maternal Uncle     Heart disease Maternal Uncle     Diabetes Paternal Uncle     Heart disease Maternal Grandmother     Heart disease Maternal Grandfather     Hypertension Neg Hx     Thyroid disease Neg Hx          Medications have been verified.        Objective   /72   Pulse 74   Temp 98.6 °F (37 °C)   Resp 20   Ht 5' 5\" (1.651 m)   Wt 87.1 kg (192 lb)   LMP 2017 (Exact Date)   SpO2 99%   BMI 31.95 kg/m²        Physical Exam     Physical Exam  Vitals and nursing note reviewed.   Constitutional:       Appearance: Normal appearance. She is normal weight.   HENT:      Head: Normocephalic and atraumatic.      Right Ear: Tympanic membrane, ear canal and external ear normal.      Left Ear: Tympanic membrane, ear canal and external ear normal.      Nose: Nose normal. No congestion.      Mouth/Throat:      Mouth: Mucous membranes are moist.      Pharynx: Oropharynx is clear.   Eyes:      Extraocular " Movements: Extraocular movements intact.      Pupils: Pupils are equal, round, and reactive to light.      Comments: Perrl eom wnl  right conj injected  + red reflex anterior chamber wnl  minor yellow d/c  lids wnl    Cardiovascular:      Rate and Rhythm: Normal rate and regular rhythm.      Pulses: Normal pulses.      Heart sounds: Normal heart sounds.   Pulmonary:      Effort: Pulmonary effort is normal.      Breath sounds: Normal breath sounds.   Abdominal:      General: Abdomen is flat. Bowel sounds are normal.      Palpations: Abdomen is soft.   Musculoskeletal:         General: Normal range of motion.      Cervical back: Normal range of motion and neck supple.   Skin:     General: Skin is warm.   Neurological:      Mental Status: She is alert and oriented to person, place, and time.

## 2024-03-09 ENCOUNTER — OFFICE VISIT (OUTPATIENT)
Dept: URGENT CARE | Age: 54
End: 2024-03-09
Payer: COMMERCIAL

## 2024-03-09 VITALS
WEIGHT: 195 LBS | OXYGEN SATURATION: 96 % | HEIGHT: 65 IN | SYSTOLIC BLOOD PRESSURE: 131 MMHG | DIASTOLIC BLOOD PRESSURE: 78 MMHG | TEMPERATURE: 100.3 F | BODY MASS INDEX: 32.49 KG/M2 | RESPIRATION RATE: 20 BRPM | HEART RATE: 100 BPM

## 2024-03-09 DIAGNOSIS — Z20.828 EXPOSURE TO THE FLU: Primary | ICD-10-CM

## 2024-03-09 PROCEDURE — 99213 OFFICE O/P EST LOW 20 MIN: CPT

## 2024-03-09 PROCEDURE — 87636 SARSCOV2 & INF A&B AMP PRB: CPT

## 2024-03-09 RX ORDER — BENZONATATE 200 MG/1
200 CAPSULE ORAL 3 TIMES DAILY PRN
Qty: 20 CAPSULE | Refills: 0 | Status: SHIPPED | OUTPATIENT
Start: 2024-03-09

## 2024-03-09 NOTE — PROGRESS NOTES
[facility]        NAME: Gale Martin is a 53 y.o. female  : 1970    MRN: [id number]  DATE: 2024  TIME: 11:24 AM    Assessment and Plan   Exposure to the flu [Z20.828]  1. Exposure to the flu  benzonatate (TESSALON) 200 MG capsule        Michelle has flu B and covid- symptoms started wed for patient- no meds taken- requesting test. Discussed symptom management. No SOB/wheezing    Patient Instructions       Follow up with PCP in 3-5 days.  Proceed to  ER if symptoms worsen.    If tests have been performed at [facility], our office will contact you with results if changes need to be made to the care plan discussed with you at the visit.  [facility]    Chief Complaint   No chief complaint on file.        History of Present Illness       Michelle has flu B and covid- symptoms started wed for patient- no meds taken- requesting test. Discussed symptom management. No SOB/wheezing        Review of Systems   Review of Systems   Constitutional:  Positive for activity change and fever.   HENT:  Positive for congestion, postnasal drip, rhinorrhea and sinus pressure.    Respiratory:  Positive for cough. Negative for shortness of breath and wheezing.    Musculoskeletal:  Positive for myalgias.   All other systems reviewed and are negative.        Current Medications       Current Outpatient Medications:     albuterol (PROVENTIL HFA,VENTOLIN HFA) 90 mcg/act inhaler, 2 puffs every 6 (six) hours as needed, Disp: , Rfl:     benzonatate (TESSALON) 200 MG capsule, Take 1 capsule (200 mg total) by mouth 3 (three) times a day as needed for cough, Disp: 20 capsule, Rfl: 0    cholecalciferol (VITAMIN D3) 1,000 units tablet, Take 1,000 Units by mouth daily, Disp: , Rfl:     omeprazole (PriLOSEC) 20 mg delayed release capsule, Take 1 capsule (20 mg total) by mouth daily, Disp: 30 capsule, Rfl: 3    Current Allergies     Allergies as of 2024    (No Known  "Allergies)            The following portions of the patient's history were reviewed and updated as appropriate: allergies, current medications, past family history, past medical history, past social history, past surgical history and problem list.     Past Medical History:   Diagnosis Date    Asthma     Cancer (HCC)     melanoma    Migraines     Obese     gastric sleeve today 2022    Seasonal allergies     Stress incontinence 2021       Past Surgical History:   Procedure Laterality Date     SECTION      IN LAPS GSTRC RSTRICTIV PX LONGITUDINAL GASTRECTOMY N/A 2022    Procedure: LAP SLEEVE GASTRECTOMY W/ ROBOT;  Surgeon: Bryant Sidhu MD;  Location: [facility];  Service: Bariatrics    SKIN CANCER EXCISION Left     melanoma thigh       Family History   Problem Relation Age of Onset    Lung cancer Mother     Heart disease Mother     Diabetes Father     Cancer Maternal Uncle         bladder    Stroke Maternal Uncle     Diabetes Maternal Uncle     Heart disease Maternal Uncle     Diabetes Paternal Uncle     Heart disease Maternal Grandmother     Heart disease Maternal Grandfather     Hypertension Neg Hx     Thyroid disease Neg Hx          Medications have been verified.        Objective   /78   Pulse 100   Temp 100.3 °F (37.9 °C)   Resp 20   Ht 5' 5\" (1.651 m)   Wt 88.5 kg (195 lb)   LMP 2017 (Exact Date)   SpO2 96%   BMI 32.45 kg/m²   Patient's last menstrual period was 2017 (exact date).       Physical Exam     Physical Exam  Vitals reviewed.   Constitutional:       Appearance: Normal appearance.   HENT:      Nose: Congestion and rhinorrhea present.   Cardiovascular:      Rate and Rhythm: Normal rate and regular rhythm.      Pulses: Normal pulses.      Heart sounds: Normal heart sounds.   Pulmonary:      Effort: Pulmonary effort is normal.      Breath sounds: Normal breath sounds.   Musculoskeletal:         General: Normal range of motion.   Lymphadenopathy:      " Cervical: Cervical adenopathy present.   Skin:     General: Skin is warm and dry.   Neurological:      Mental Status: She is alert.

## 2024-03-10 LAB
FLUAV RNA RESP QL NAA+PROBE: NEGATIVE
FLUBV RNA RESP QL NAA+PROBE: POSITIVE
SARS-COV-2 RNA RESP QL NAA+PROBE: NEGATIVE

## 2024-11-04 ENCOUNTER — TELEPHONE (OUTPATIENT)
Dept: BARIATRICS | Facility: CLINIC | Age: 54
End: 2024-11-04

## 2024-11-04 NOTE — TELEPHONE ENCOUNTER
----- Message from Kymberly MATHIAS sent at 10/31/2024  9:08 AM EDT -----  Regardin year f/u appointment  Please contact patient to schedule a 2 year follow up appointment and document the results of the call in EPIC.  Thank You

## (undated) DEVICE — ASTOUND STANDARD SURGICAL GOWN, XL: Brand: CONVERTORS

## (undated) DEVICE — MEGA SUTURECUT ND: Brand: ENDOWRIST

## (undated) DEVICE — GLOVE SRG BIOGEL 7.5

## (undated) DEVICE — ABSORBABLE WOUND CLOSURE DEVICE: Brand: V-LOC 90

## (undated) DEVICE — SUT MONOCRYL 4-0 PS-2 27 IN Y426H

## (undated) DEVICE — STAPLER 60 RELOAD BLUE: Brand: SUREFORM

## (undated) DEVICE — CADIERE FORCEPS: Brand: ENDOWRIST

## (undated) DEVICE — STAPLER 60 RELOAD WHITE: Brand: SUREFORM

## (undated) DEVICE — VIOLET BRAIDED (POLYGLACTIN 910), SYNTHETIC ABSORBABLE SUTURE: Brand: COATED VICRYL

## (undated) DEVICE — COLUMN DRAPE

## (undated) DEVICE — PLUMEPEN PRO 10FT

## (undated) DEVICE — SCD SEQUENTIAL COMPRESSION COMFORT SLEEVE MEDIUM KNEE LENGTH: Brand: KENDALL SCD

## (undated) DEVICE — ENDOPATH XCEL BLADELESS TROCARS WITH STABILITY SLEEVES: Brand: ENDOPATH XCEL

## (undated) DEVICE — STAPLER 60: Brand: SUREFORM

## (undated) DEVICE — CANNULA SEAL

## (undated) DEVICE — [HIGH FLOW INSUFFLATOR,  DO NOT USE IF PACKAGE IS DAMAGED,  KEEP DRY,  KEEP AWAY FROM SUNLIGHT,  PROTECT FROM HEAT AND RADIOACTIVE SOURCES.]: Brand: PNEUMOSURE

## (undated) DEVICE — KIT, ROBOTIC BARIATRIC: Brand: CARDINAL HEALTH

## (undated) DEVICE — STAPLER CANNULA SEAL: Brand: ENDOWRIST

## (undated) DEVICE — TROCAR: Brand: KII FIOS FIRST ENTRY

## (undated) DEVICE — VISIGI 3D®  CALIBRATION SYSTEM  SIZE 36FR SLEEVE/STD: Brand: BOEHRINGER® VISIGI 3D™ SLEEVE GASTRECTOMY CALIBRATION SYSTEM, SIZE 36FR

## (undated) DEVICE — REDUCER: Brand: ENDOWRIST

## (undated) DEVICE — DRAPE EQUIPMENT RF WAND

## (undated) DEVICE — WEBRIL 6 IN UNSTERILE

## (undated) DEVICE — STRL COTTON TIP APPLCTR 6IN PK: Brand: CARDINAL HEALTH

## (undated) DEVICE — ARM DRAPE

## (undated) DEVICE — BLADELESS OBTURATOR: Brand: WECK VISTA

## (undated) DEVICE — VIAL DECANTER